# Patient Record
Sex: FEMALE | Race: WHITE
[De-identification: names, ages, dates, MRNs, and addresses within clinical notes are randomized per-mention and may not be internally consistent; named-entity substitution may affect disease eponyms.]

---

## 2021-04-16 ENCOUNTER — HOSPITAL ENCOUNTER (OUTPATIENT)
Age: 21
End: 2021-04-16
Payer: COMMERCIAL

## 2021-04-16 DIAGNOSIS — N39.0: Primary | ICD-10-CM

## 2021-04-16 PROCEDURE — 76770 US EXAM ABDO BACK WALL COMP: CPT

## 2021-05-25 ENCOUNTER — HOSPITAL ENCOUNTER (OUTPATIENT)
Dept: HOSPITAL 100 - SDC | Age: 21
Discharge: HOME | End: 2021-05-25
Payer: COMMERCIAL

## 2021-05-25 VITALS
DIASTOLIC BLOOD PRESSURE: 70 MMHG | OXYGEN SATURATION: 96 % | RESPIRATION RATE: 16 BRPM | SYSTOLIC BLOOD PRESSURE: 121 MMHG | HEART RATE: 75 BPM

## 2021-05-25 VITALS
SYSTOLIC BLOOD PRESSURE: 121 MMHG | RESPIRATION RATE: 16 BRPM | DIASTOLIC BLOOD PRESSURE: 68 MMHG | HEART RATE: 64 BPM | OXYGEN SATURATION: 99 %

## 2021-05-25 VITALS
TEMPERATURE: 97.88 F | RESPIRATION RATE: 18 BRPM | SYSTOLIC BLOOD PRESSURE: 121 MMHG | OXYGEN SATURATION: 100 % | DIASTOLIC BLOOD PRESSURE: 68 MMHG | HEART RATE: 73 BPM

## 2021-05-25 VITALS
SYSTOLIC BLOOD PRESSURE: 121 MMHG | DIASTOLIC BLOOD PRESSURE: 73 MMHG | OXYGEN SATURATION: 98 % | HEART RATE: 69 BPM | RESPIRATION RATE: 16 BRPM | TEMPERATURE: 97.3 F

## 2021-05-25 VITALS
RESPIRATION RATE: 16 BRPM | SYSTOLIC BLOOD PRESSURE: 109 MMHG | HEART RATE: 73 BPM | DIASTOLIC BLOOD PRESSURE: 76 MMHG | OXYGEN SATURATION: 100 %

## 2021-05-25 VITALS
RESPIRATION RATE: 16 BRPM | TEMPERATURE: 97.52 F | DIASTOLIC BLOOD PRESSURE: 81 MMHG | HEART RATE: 68 BPM | SYSTOLIC BLOOD PRESSURE: 108 MMHG | OXYGEN SATURATION: 98 %

## 2021-05-25 VITALS
TEMPERATURE: 97.3 F | OXYGEN SATURATION: 98 % | RESPIRATION RATE: 16 BRPM | HEART RATE: 75 BPM | SYSTOLIC BLOOD PRESSURE: 121 MMHG | DIASTOLIC BLOOD PRESSURE: 68 MMHG

## 2021-05-25 VITALS
DIASTOLIC BLOOD PRESSURE: 68 MMHG | SYSTOLIC BLOOD PRESSURE: 121 MMHG | RESPIRATION RATE: 16 BRPM | HEART RATE: 66 BPM | OXYGEN SATURATION: 99 %

## 2021-05-25 VITALS — BODY MASS INDEX: 30.2 KG/M2

## 2021-05-25 DIAGNOSIS — Z87.2: ICD-10-CM

## 2021-05-25 DIAGNOSIS — N39.0: Primary | ICD-10-CM

## 2021-05-25 DIAGNOSIS — N39.41: ICD-10-CM

## 2021-05-25 DIAGNOSIS — Z79.3: ICD-10-CM

## 2021-05-25 LAB — INTERNAL QC VALIDATED?: (no result)

## 2021-05-25 PROCEDURE — 52000 CYSTOURETHROSCOPY: CPT

## 2021-05-25 PROCEDURE — 0TBB8ZX EXCISION OF BLADDER, VIA NATURAL OR ARTIFICIAL OPENING ENDOSCOPIC, DIAGNOSTIC: ICD-10-PCS | Performed by: UROLOGY

## 2021-05-25 PROCEDURE — 00910 ANES TRANSURETHRAL PX NOS: CPT

## 2021-05-25 PROCEDURE — 81025 URINE PREGNANCY TEST: CPT

## 2021-05-25 RX ADMIN — CEFAZOLIN 150 GM: 10 INJECTION, POWDER, FOR SOLUTION INTRAVENOUS at 07:30

## 2023-09-14 PROBLEM — G43.909 MIGRAINE HEADACHE: Status: ACTIVE | Noted: 2023-09-14

## 2023-09-14 PROBLEM — E78.1 HYPERTRIGLYCERIDEMIA: Status: ACTIVE | Noted: 2023-09-14

## 2023-09-14 PROBLEM — F11.20 OPIOID DEPENDENCE (MULTI): Status: ACTIVE | Noted: 2023-09-14

## 2023-09-14 PROBLEM — D70.9 NEUTROPENIA (CMS-HCC): Status: ACTIVE | Noted: 2023-09-14

## 2023-09-14 PROBLEM — R20.0 NUMBNESS: Status: ACTIVE | Noted: 2023-09-14

## 2023-09-14 PROBLEM — R11.0 CHEMOTHERAPY-INDUCED NAUSEA: Status: ACTIVE | Noted: 2023-09-14

## 2023-09-14 PROBLEM — T45.1X5A CHEMOTHERAPY-INDUCED NEUROPATHY (MULTI): Status: ACTIVE | Noted: 2023-09-14

## 2023-09-14 PROBLEM — F41.9 ANXIETY: Status: ACTIVE | Noted: 2023-09-14

## 2023-09-14 PROBLEM — G62.0 CHEMOTHERAPY-INDUCED NEUROPATHY (MULTI): Status: ACTIVE | Noted: 2023-09-14

## 2023-09-14 PROBLEM — N64.52 NIPPLE DISCHARGE: Status: ACTIVE | Noted: 2023-09-14

## 2023-09-14 PROBLEM — N94.6 DYSMENORRHEA: Status: ACTIVE | Noted: 2023-09-14

## 2023-09-14 PROBLEM — G47.10 HYPERSOMNIA, UNSPECIFIED: Status: ACTIVE | Noted: 2023-09-14

## 2023-09-14 PROBLEM — G47.30 SLEEP-DISORDERED BREATHING: Status: ACTIVE | Noted: 2023-09-14

## 2023-09-14 PROBLEM — T45.1X5A CHEMOTHERAPY-INDUCED NAUSEA: Status: ACTIVE | Noted: 2023-09-14

## 2023-09-14 PROBLEM — H35.63: Status: ACTIVE | Noted: 2023-09-14

## 2023-09-14 PROBLEM — R94.31 PROLONGED QT INTERVAL: Status: ACTIVE | Noted: 2023-09-14

## 2023-09-14 PROBLEM — F51.09 SITUATIONAL INSOMNIA: Status: ACTIVE | Noted: 2023-09-14

## 2023-09-14 PROBLEM — C92.40: Status: ACTIVE | Noted: 2023-09-14

## 2023-09-14 PROBLEM — G43.009 MIGRAINE WITHOUT AURA AND WITHOUT STATUS MIGRAINOSUS, NOT INTRACTABLE: Status: ACTIVE | Noted: 2023-09-14

## 2023-09-14 RX ORDER — NAPROXEN 500 MG/1
500 TABLET ORAL 3 TIMES DAILY PRN
COMMUNITY
End: 2024-03-27 | Stop reason: WASHOUT

## 2023-10-17 ENCOUNTER — OFFICE VISIT (OUTPATIENT)
Dept: OPHTHALMOLOGY | Facility: CLINIC | Age: 23
End: 2023-10-17
Payer: COMMERCIAL

## 2023-10-17 DIAGNOSIS — H35.63 RETINAL HEMORRHAGE, BOTH EYES: ICD-10-CM

## 2023-10-17 DIAGNOSIS — H52.03 HYPEROPIA OF BOTH EYES: Primary | ICD-10-CM

## 2023-10-17 PROCEDURE — 92012 INTRM OPH EXAM EST PATIENT: CPT | Performed by: STUDENT IN AN ORGANIZED HEALTH CARE EDUCATION/TRAINING PROGRAM

## 2023-10-17 ASSESSMENT — ENCOUNTER SYMPTOMS
CARDIOVASCULAR NEGATIVE: 0
ALLERGIC/IMMUNOLOGIC NEGATIVE: 0
PSYCHIATRIC NEGATIVE: 0
RESPIRATORY NEGATIVE: 0
ENDOCRINE NEGATIVE: 0
HEMATOLOGIC/LYMPHATIC NEGATIVE: 0
MUSCULOSKELETAL NEGATIVE: 0
CONSTITUTIONAL NEGATIVE: 0
GASTROINTESTINAL NEGATIVE: 0
EYES NEGATIVE: 0
NEUROLOGICAL NEGATIVE: 0

## 2023-10-17 ASSESSMENT — SLIT LAMP EXAM - LIDS
COMMENTS: NORMAL
COMMENTS: NORMAL

## 2023-10-17 ASSESSMENT — VISUAL ACUITY
OD_SC: 20/20
METHOD: SNELLEN - LINEAR
OS_SC: 20/30

## 2023-10-17 ASSESSMENT — REFRACTION_MANIFEST
OS_SPHERE: -0.75
OS_CYLINDER: SPHERE
METHOD_AUTOREFRACTION: 1
OD_SPHERE: +0.25
OS_SPHERE: +0.25
OD_CYLINDER: SPHERE
OD_SPHERE: -0.50

## 2023-10-17 ASSESSMENT — TONOMETRY
IOP_METHOD: GOLDMANN APPLANATION
OS_IOP_MMHG: 15
OD_IOP_MMHG: 14

## 2023-10-17 ASSESSMENT — CUP TO DISC RATIO
OS_RATIO: .40
OD_RATIO: .40

## 2023-10-17 ASSESSMENT — CONF VISUAL FIELD
OS_SUPERIOR_TEMPORAL_RESTRICTION: 0
METHOD: COUNTING FINGERS
OS_NORMAL: 1
OD_INFERIOR_NASAL_RESTRICTION: 0
OD_SUPERIOR_NASAL_RESTRICTION: 0
OD_NORMAL: 1
OS_INFERIOR_TEMPORAL_RESTRICTION: 0
OS_SUPERIOR_NASAL_RESTRICTION: 0
OS_INFERIOR_NASAL_RESTRICTION: 0
OD_INFERIOR_TEMPORAL_RESTRICTION: 0
OD_SUPERIOR_TEMPORAL_RESTRICTION: 0

## 2023-10-17 ASSESSMENT — EXTERNAL EXAM - RIGHT EYE: OD_EXAM: NORMAL

## 2023-10-17 ASSESSMENT — EXTERNAL EXAM - LEFT EYE: OS_EXAM: NORMAL

## 2023-10-17 NOTE — ASSESSMENT & PLAN NOTE
Patient here for vision exam. Noticing central vision changes left eye (OS). 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 Patient reports noticing floaters in her vision that is causing disruption.     On exam today small MRX that does not improve vision. BCVA right eye (OD) 20/20 left eye (OS) 20/30. Advised that MRX is not necessary to fill; would not help with visual acuity.    OCT retina taken today to rule out central edema. Normal OCT both eyes (OU).     Patient has appt with retina for further eval 12/2023. RTC as scheduled for further eval.

## 2023-10-17 NOTE — ASSESSMENT & PLAN NOTE
Patient's last exam with retina Dr. 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     Retina appears healthy today with resolution of hemes and normal MAC OCT.

## 2023-10-17 NOTE — PROGRESS NOTES
Assessment/Plan   Problem List Items Addressed This Visit          Eye/Vision problems    Retinal hemorrhage, both eyes - Primary     Patient's last exam with retina Dr. 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     Retina appears healthy today with resolution of hemes and normal MAC OCT.          Relevant Orders    OCT, Retina - OU - Both Eyes (Completed)    Hyperopia of both eyes     Patient here for vision exam. Noticing central vision changes left eye (OS). 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 Patient reports noticing floaters in her vision that is causing disruption.     On exam today small MRX that does not improve vision. BCVA right eye (OD) 20/20 left eye (OS) 20/30. Advised that MRX is not necessary to fill; would not help with visual acuity.    OCT retina taken today to rule out central edema. Normal OCT both eyes (OU).     Patient has appt with retina for further eval 12/2023. RTC as scheduled for further eval.

## 2023-11-29 ENCOUNTER — APPOINTMENT (OUTPATIENT)
Dept: PEDIATRIC HEMATOLOGY/ONCOLOGY | Facility: HOSPITAL | Age: 23
End: 2023-11-29
Payer: COMMERCIAL

## 2023-12-06 ENCOUNTER — APPOINTMENT (OUTPATIENT)
Dept: PEDIATRIC HEMATOLOGY/ONCOLOGY | Facility: HOSPITAL | Age: 23
End: 2023-12-06
Payer: COMMERCIAL

## 2023-12-08 ENCOUNTER — OFFICE VISIT (OUTPATIENT)
Dept: OPHTHALMOLOGY | Facility: CLINIC | Age: 23
End: 2023-12-08
Payer: COMMERCIAL

## 2023-12-08 DIAGNOSIS — H35.63 RETINAL HEMORRHAGE OF BOTH EYES: Primary | ICD-10-CM

## 2023-12-08 PROCEDURE — 92134 CPTRZ OPH DX IMG PST SGM RTA: CPT | Mod: BILATERAL PROCEDURE | Performed by: OPHTHALMOLOGY

## 2023-12-08 PROCEDURE — 99214 OFFICE O/P EST MOD 30 MIN: CPT | Performed by: OPHTHALMOLOGY

## 2023-12-08 RX ORDER — BUPROPION HYDROCHLORIDE 300 MG/1
300 TABLET ORAL
COMMUNITY
Start: 2023-11-28

## 2023-12-08 ASSESSMENT — TONOMETRY
OS_IOP_MMHG: 14
OD_IOP_MMHG: 12
IOP_METHOD: GOLDMANN APPLANATION

## 2023-12-08 ASSESSMENT — VISUAL ACUITY
OD_SC: 20/20
OS_SC: 20/25-2
METHOD: SNELLEN - LINEAR

## 2023-12-08 ASSESSMENT — EXTERNAL EXAM - LEFT EYE: OS_EXAM: NORMAL

## 2023-12-08 ASSESSMENT — SLIT LAMP EXAM - LIDS
COMMENTS: NORMAL
COMMENTS: NORMAL

## 2023-12-08 ASSESSMENT — EXTERNAL EXAM - RIGHT EYE: OD_EXAM: NORMAL

## 2023-12-08 NOTE — PROGRESS NOTES
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       Hi quality OCT  scans obtained  signal good    OCT OD - Normal Foveal Contour, No Edema, IS/OS Junction Normal  OCT OS - Normal Foveal Contour, No Edema, IS/OS Junction Normal    additional commnents:    Leukemic retinopathy OU   Hemes have almost completely resolved  Educated patient of this and natural resolution. Continue treatment with heme/onc.  Will monitor. RTC 6 months, sooner PRN    small scotomas are resolving both eyes but worse left eye      Needs fluorescein  angiogram (second)  Visual filed 30-2  (first)

## 2023-12-13 ENCOUNTER — APPOINTMENT (OUTPATIENT)
Dept: PEDIATRIC HEMATOLOGY/ONCOLOGY | Facility: HOSPITAL | Age: 23
End: 2023-12-13
Payer: COMMERCIAL

## 2023-12-15 NOTE — PROGRESS NOTES
Patient ID: Cayetano Crooks is a 23 y.o. female.  Referring Physician: No referring provider defined for this encounter.  Primary Care Provider: No primary care provider on file.    Date of Service:  12/20/2023    SUBJECTIVE:    History of Present Illness:  Cayetano is overall doing well. She states that she is trying to keep a more healthy diet and has been walking more. Denies any concerns such as lymphadenopathy, fatigue, SOB, chest pain, bleeding and bruising issues. Is sleeping well and has been going out with her friends.     She is not taking any medications at his point and continues to live with her boyfriend. No concerns reported.      Oncology History:    Oncology History    No history exists.       Past Medical / Family / Social History:  Past Medical, Family, and Social History reviewed and unchanged since the last visit.    Review of Systems   Constitutional:  Negative for appetite change, chills, fatigue, fever and unexpected weight change.   HENT:   Negative for lump/mass, mouth sores and nosebleeds.    Eyes:  Negative for icterus.   Respiratory:  Negative for cough and shortness of breath.    Cardiovascular:  Negative for chest pain.   Gastrointestinal:  Negative for abdominal pain, blood in stool, constipation, diarrhea and nausea.   Genitourinary:  Negative for frequency.    Skin:  Negative for rash.   Neurological:  Negative for headaches.   Hematological:  Negative for adenopathy. Does not bruise/bleed easily.         OBJECTIVE:    BP as of 12/20/2023 132/87        Heart Rate: 84  as of 12/20/2023   Resp: 20  as of 12/20/2023   Temp: 36.8 °C (98.2 °F)  as of 12/20/2023       Physical Exam  Constitutional:       Appearance: Normal appearance.   HENT:      Head: Normocephalic and atraumatic.      Nose: Nose normal.      Mouth/Throat:      Mouth: Mucous membranes are moist.      Pharynx: Oropharynx is clear.   Cardiovascular:      Rate and Rhythm: Normal rate and regular rhythm.   Pulmonary:       Effort: Pulmonary effort is normal.      Breath sounds: Normal breath sounds. No wheezing or rhonchi.   Abdominal:      General: Bowel sounds are normal.      Palpations: Abdomen is soft.      Tenderness: There is no abdominal tenderness. There is no guarding.   Musculoskeletal:         General: Normal range of motion.      Cervical back: Normal range of motion and neck supple.   Skin:     General: Skin is warm.      Findings: No rash.   Neurological:      General: No focal deficit present.      Mental Status: She is alert and oriented to person, place, and time.   Psychiatric:         Mood and Affect: Mood normal.         Laboratory:   Latest Reference Range & Units 12/20/23 09:14   GLUCOSE 74 - 99 mg/dL 91   SODIUM 136 - 145 mmol/L 141   POTASSIUM 3.5 - 5.3 mmol/L 4.3   CHLORIDE 98 - 107 mmol/L 105   Bicarbonate 21 - 32 mmol/L 26   Anion Gap 10 - 20 mmol/L 14   Blood Urea Nitrogen 6 - 23 mg/dL 6   Creatinine 0.50 - 1.05 mg/dL 0.62   EGFR >60 mL/min/1.73m*2 >90   Calcium 8.6 - 10.6 mg/dL 9.7   PHOSPHORUS 2.5 - 4.9 mg/dL 3.5   Albumin 3.4 - 5.0 g/dL 4.4   Alkaline Phosphatase 33 - 110 U/L 66   ALT 7 - 45 U/L 10   AST 9 - 39 U/L 8 (L)   Bilirubin Total 0.0 - 1.2 mg/dL 0.6   Bilirubin, Direct 0.0 - 0.3 mg/dL 0.1   Total Protein 6.4 - 8.2 g/dL 7.3   PML/RANJIT T(15;17), FISH  Rpt   WBC 4.4 - 11.3 x10*3/uL 6.4   nRBC 0.0 - 0.0 /100 WBCs 0.0   RBC 4.00 - 5.20 x10*6/uL 4.76   HEMOGLOBIN 12.0 - 16.0 g/dL 13.9   HEMATOCRIT 36.0 - 46.0 % 41.6   MCV 80 - 100 fL 87   MCH 26.0 - 34.0 pg 29.2   MCHC 32.0 - 36.0 g/dL 33.4   RED CELL DISTRIBUTION WIDTH 11.5 - 14.5 % 11.8   Platelets 150 - 450 x10*3/uL 266   Neutrophils % 40.0 - 80.0 % 76.3   Immature Granulocytes %, Automated 0.0 - 0.9 % 0.3   Lymphocytes % 13.0 - 44.0 % 12.6   Monocytes % 2.0 - 10.0 % 9.6   Eosinophils % 0.0 - 6.0 % 0.9   Basophils % 0.0 - 2.0 % 0.3   Neutrophils Absolute 1.20 - 7.70 x10*3/uL 4.90   Immature Granulocytes Absolute, Automated 0.00 - 0.70 x10*3/uL  0.02   Lymphocytes Absolute 1.20 - 4.80 x10*3/uL 0.81 (L)   Monocytes Absolute 0.10 - 1.00 x10*3/uL 0.62   Eosinophils Absolute 0.00 - 0.70 x10*3/uL 0.06   Basophils Absolute 0.00 - 0.10 x10*3/uL 0.02   (L): Data is abnormally low  Rpt: View report in Results Review for more information    ASSESSMENT and PLAN:    Cayetano is a 23 yr old female with history of APML, treated as per [id number].  Completed therapy on 6/20/22. Here today for 18 month off therapy visit.     Cayetano is well appearing on exam today. Her counts are all wnl. No new concerns.      PML:RANJIT FISH by peripheral blood was performed this time and it was negative. We will send RT-PCR for her next visit.    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 Completed Induction therapy on 11/23/21.   - End of Induction BM bx on 11/23/21 in both morphologic and molecular remission (PML- RANJIT copies undetectable). PML-RANJIT FISH by peripheral blood negative after two consolidation cycles.   - Last day of chemotherapy was 6/20/22. Most recent PB PML-RANJIT RQ-PCR sent 8/30/23 was negative. FISH sent today 12/20/23 was negative.    - Will continue with every 3 month visits      CNS:  - Headaches: Completed Propranolol wean for migraines. No recent migraines. Dr. Corley followed Cayetano however, no further follow-up is needed at this time unless headaches return.      Optho:  - hx of subconjunctival hemorrhage. Denies new or worsening vision changes but continued poor vision in left eye especially with reading.  Interested in glasses, has appointment for evaluation for glasses upcoming.   - Cayetano saw Optho on 6/16/23, stable to improving exam. Scheduled to see them again in December.      CV:   - Continue with every 5 years per COG guidelines, next due 5/2028       GYN:   - Followed by TAMMY who reports she has good ovarian reserve function, follows  with GYN.      RTC: in 3 months (March 2024) for labs and physical exam, she will need a PML-RANJIT RQ-PCR sent at that visit.

## 2023-12-20 ENCOUNTER — HOSPITAL ENCOUNTER (OUTPATIENT)
Dept: PEDIATRIC HEMATOLOGY/ONCOLOGY | Facility: HOSPITAL | Age: 23
Discharge: HOME | End: 2023-12-20
Payer: COMMERCIAL

## 2023-12-20 VITALS
HEART RATE: 84 BPM | DIASTOLIC BLOOD PRESSURE: 87 MMHG | WEIGHT: 186.07 LBS | TEMPERATURE: 98.2 F | BODY MASS INDEX: 28.2 KG/M2 | HEIGHT: 68 IN | RESPIRATION RATE: 20 BRPM | SYSTOLIC BLOOD PRESSURE: 132 MMHG

## 2023-12-20 DIAGNOSIS — C92.41 ACUTE PROMYELOCYTIC LEUKEMIA IN REMISSION (MULTI): Primary | ICD-10-CM

## 2023-12-20 LAB
ALBUMIN SERPL BCP-MCNC: 4.4 G/DL (ref 3.4–5)
ALP SERPL-CCNC: 66 U/L (ref 33–110)
ALT SERPL W P-5'-P-CCNC: 10 U/L (ref 7–45)
ANION GAP SERPL CALC-SCNC: 14 MMOL/L (ref 10–20)
AST SERPL W P-5'-P-CCNC: 8 U/L (ref 9–39)
BASOPHILS # BLD AUTO: 0.02 X10*3/UL (ref 0–0.1)
BASOPHILS NFR BLD AUTO: 0.3 %
BILIRUB DIRECT SERPL-MCNC: 0.1 MG/DL (ref 0–0.3)
BILIRUB SERPL-MCNC: 0.6 MG/DL (ref 0–1.2)
BUN SERPL-MCNC: 6 MG/DL (ref 6–23)
CALCIUM SERPL-MCNC: 9.7 MG/DL (ref 8.6–10.6)
CHLORIDE SERPL-SCNC: 105 MMOL/L (ref 98–107)
CO2 SERPL-SCNC: 26 MMOL/L (ref 21–32)
CREAT SERPL-MCNC: 0.62 MG/DL (ref 0.5–1.05)
EOSINOPHIL # BLD AUTO: 0.06 X10*3/UL (ref 0–0.7)
EOSINOPHIL NFR BLD AUTO: 0.9 %
ERYTHROCYTE [DISTWIDTH] IN BLOOD BY AUTOMATED COUNT: 11.8 % (ref 11.5–14.5)
GFR SERPL CREATININE-BSD FRML MDRD: >90 ML/MIN/1.73M*2
GLUCOSE SERPL-MCNC: 91 MG/DL (ref 74–99)
HCT VFR BLD AUTO: 41.6 % (ref 36–46)
HGB BLD-MCNC: 13.9 G/DL (ref 12–16)
IMM GRANULOCYTES # BLD AUTO: 0.02 X10*3/UL (ref 0–0.7)
IMM GRANULOCYTES NFR BLD AUTO: 0.3 % (ref 0–0.9)
LYMPHOCYTES # BLD AUTO: 0.81 X10*3/UL (ref 1.2–4.8)
LYMPHOCYTES NFR BLD AUTO: 12.6 %
MCH RBC QN AUTO: 29.2 PG (ref 26–34)
MCHC RBC AUTO-ENTMCNC: 33.4 G/DL (ref 32–36)
MCV RBC AUTO: 87 FL (ref 80–100)
MONOCYTES # BLD AUTO: 0.62 X10*3/UL (ref 0.1–1)
MONOCYTES NFR BLD AUTO: 9.6 %
NEUTROPHILS # BLD AUTO: 4.9 X10*3/UL (ref 1.2–7.7)
NEUTROPHILS NFR BLD AUTO: 76.3 %
NRBC BLD-RTO: 0 /100 WBCS (ref 0–0)
PHOSPHATE SERPL-MCNC: 3.5 MG/DL (ref 2.5–4.9)
PLATELET # BLD AUTO: 266 X10*3/UL (ref 150–450)
POTASSIUM SERPL-SCNC: 4.3 MMOL/L (ref 3.5–5.3)
PROT SERPL-MCNC: 7.3 G/DL (ref 6.4–8.2)
RBC # BLD AUTO: 4.76 X10*6/UL (ref 4–5.2)
SODIUM SERPL-SCNC: 141 MMOL/L (ref 136–145)
WBC # BLD AUTO: 6.4 X10*3/UL (ref 4.4–11.3)

## 2023-12-20 PROCEDURE — 88275 CYTOGENETICS 100-300: CPT | Performed by: NURSE PRACTITIONER

## 2023-12-20 PROCEDURE — 99215 OFFICE O/P EST HI 40 MIN: CPT | Performed by: STUDENT IN AN ORGANIZED HEALTH CARE EDUCATION/TRAINING PROGRAM

## 2023-12-20 PROCEDURE — 88271 CYTOGENETICS DNA PROBE: CPT | Performed by: NURSE PRACTITIONER

## 2023-12-20 PROCEDURE — 85025 COMPLETE CBC W/AUTO DIFF WBC: CPT | Performed by: NURSE PRACTITIONER

## 2023-12-20 PROCEDURE — 36415 COLL VENOUS BLD VENIPUNCTURE: CPT | Performed by: NURSE PRACTITIONER

## 2023-12-20 PROCEDURE — 88291 CYTO/MOLECULAR REPORT: CPT | Performed by: NURSE PRACTITIONER

## 2023-12-20 PROCEDURE — 80048 BASIC METABOLIC PNL TOTAL CA: CPT | Performed by: NURSE PRACTITIONER

## 2023-12-20 PROCEDURE — 82248 BILIRUBIN DIRECT: CPT | Performed by: NURSE PRACTITIONER

## 2023-12-20 PROCEDURE — 84100 ASSAY OF PHOSPHORUS: CPT | Performed by: NURSE PRACTITIONER

## 2023-12-20 ASSESSMENT — ENCOUNTER SYMPTOMS
LOSS OF SENSATION IN FEET: 0
OCCASIONAL FEELINGS OF UNSTEADINESS: 0
DEPRESSION: 0

## 2023-12-20 ASSESSMENT — PAIN SCALES - GENERAL: PAINLEVEL: 0-NO PAIN

## 2023-12-20 NOTE — PROGRESS NOTES
Massage Therapy / Acupuncture Note:  I visited with Cayetano today.  She was in great spirits.  We talked about her family, how she has been feeling, how survivorship can be hard, friends and the holidays.  She stated she is doing well.  I will continue to follow.

## 2023-12-27 LAB
CHROM ANALY OVERALL INTERP-IMP: NORMAL
ELECTRONICALLY COSIGNED BY CYTOGENETICS: NORMAL
ELECTRONICALLY SIGNED BY CYTOGENETICS: NORMAL
STRUCT VAR ISCN NAME: NORMAL

## 2023-12-28 ENCOUNTER — TELEPHONE (OUTPATIENT)
Dept: PEDIATRIC HEMATOLOGY/ONCOLOGY | Facility: HOSPITAL | Age: 23
End: 2023-12-28

## 2023-12-28 NOTE — TELEPHONE ENCOUNTER
Reviewed recent RANJIT test with Cayetano which was negative. Will continue with same plan and she will return to clinic on 3/27.

## 2024-01-26 ENCOUNTER — OFFICE VISIT (OUTPATIENT)
Dept: OPHTHALMOLOGY | Facility: CLINIC | Age: 24
End: 2024-01-26
Payer: COMMERCIAL

## 2024-01-26 DIAGNOSIS — H52.03 HYPEROPIA OF BOTH EYES: ICD-10-CM

## 2024-01-26 DIAGNOSIS — H35.63 RETINAL HEMORRHAGE, BOTH EYES: ICD-10-CM

## 2024-01-26 DIAGNOSIS — F41.9 ANXIETY: ICD-10-CM

## 2024-01-26 DIAGNOSIS — H35.62 RETINAL HEMORRHAGE OF LEFT EYE: Primary | ICD-10-CM

## 2024-01-26 PROCEDURE — 92235 FLUORESCEIN ANGRPH MLTIFRAME: CPT | Mod: LEFT SIDE | Performed by: OPHTHALMOLOGY

## 2024-01-26 PROCEDURE — 1036F TOBACCO NON-USER: CPT | Performed by: OPHTHALMOLOGY

## 2024-01-26 PROCEDURE — 99214 OFFICE O/P EST MOD 30 MIN: CPT | Performed by: OPHTHALMOLOGY

## 2024-01-26 PROCEDURE — 92083 EXTENDED VISUAL FIELD XM: CPT | Performed by: OPHTHALMOLOGY

## 2024-01-26 PROCEDURE — 92134 CPTRZ OPH DX IMG PST SGM RTA: CPT | Mod: BILATERAL PROCEDURE | Performed by: OPHTHALMOLOGY

## 2024-01-26 RX ORDER — FLUORESCEIN 500 MG/ML
500 INJECTION INTRAVENOUS
Status: COMPLETED | OUTPATIENT
Start: 2024-01-26 | End: 2024-01-26

## 2024-01-26 RX ADMIN — FLUORESCEIN 500 MG: 500 INJECTION INTRAVENOUS at 09:33

## 2024-01-26 ASSESSMENT — TONOMETRY
OD_IOP_MMHG: 14
OS_IOP_MMHG: 15
IOP_METHOD: TONOPEN

## 2024-01-26 ASSESSMENT — VISUAL ACUITY
METHOD: SNELLEN - LINEAR
OD_SC: 20/20-2
OS_SC: 20/40-3

## 2024-01-26 ASSESSMENT — EXTERNAL EXAM - LEFT EYE: OS_EXAM: NORMAL

## 2024-01-26 ASSESSMENT — EXTERNAL EXAM - RIGHT EYE: OD_EXAM: NORMAL

## 2024-01-26 ASSESSMENT — SLIT LAMP EXAM - LIDS
COMMENTS: NORMAL
COMMENTS: NORMAL

## 2024-01-26 NOTE — PROGRESS NOTES
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       Hi quality OCT  scans obtained  signal good    OCT OD - Normal Foveal Contour, No Edema, IS/OS Junction Normal  OCT OS - Normal Foveal Contour, No Edema, IS/OS Junction Normal    additional commnents:    Leukemic retinopathy OU   Hemes have almost completely resolved  Educated patient of this and natural resolution. Continue treatment with heme/onc.  Will monitor. RTC 6 months, sooner PRN    small scotomas are resolving both eyes but worse left eye      Needs fluorescein  angiogram (second)  Visual filed 30-2  (first)    Visual [id number]  (VF) is full maybe need 10-2   Fluorescein angiography (FA) shows non specific hyper pigmentation    Needs glasses for left eye    FU 1 yr

## 2024-03-05 ASSESSMENT — ENCOUNTER SYMPTOMS
FREQUENCY: 0
ABDOMINAL PAIN: 0
CONSTIPATION: 0
APPETITE CHANGE: 0
FEVER: 0
CHILLS: 0
HEADACHES: 0
BRUISES/BLEEDS EASILY: 0
DIARRHEA: 0
BLOOD IN STOOL: 0
SCLERAL ICTERUS: 0
COUGH: 0
SHORTNESS OF BREATH: 0
UNEXPECTED WEIGHT CHANGE: 0
FATIGUE: 0
ADENOPATHY: 0
NAUSEA: 0

## 2024-03-05 NOTE — ADDENDUM NOTE
Encounter addended by: Eron Garcia MD on: 3/5/2024 12:09 PM   Actions taken: SmartForm saved, Pend clinical note, Level of Service modified, Clinical Note Signed

## 2024-03-21 ASSESSMENT — ENCOUNTER SYMPTOMS
SCLERAL ICTERUS: 0
BLOOD IN STOOL: 0
CONSTIPATION: 0
ADENOPATHY: 0
COUGH: 0
APPETITE CHANGE: 0
UNEXPECTED WEIGHT CHANGE: 0
BRUISES/BLEEDS EASILY: 0
FATIGUE: 0
CHILLS: 0
ABDOMINAL PAIN: 0
SHORTNESS OF BREATH: 0
HEADACHES: 0
FEVER: 0
DIARRHEA: 0
FREQUENCY: 0
NAUSEA: 0

## 2024-03-21 NOTE — PROGRESS NOTES
Patient ID: Cayetano Crooks is a 23 y.o. female.  Referring Physician: No referring provider defined for this encounter.  Primary Care Provider: No primary care provider on file.    Date of Service:  3/27/2024    SUBJECTIVE:    History of Present Illness:  Cayetano is in an excellent state of health,  no recent illness. She does not have any lymphadenopathy, fatigue, SOB, chest pain, bleeding and bruising issues. 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    She is looking forward to summer and getting her camper and motorcycle ready.  Is not taking any medications or supplements.      Oncology History:    Oncology History    No history exists.       Past Medical / Family / Social History:  Past Medical, Family, and Social History reviewed and unchanged since the last visit.    Review of Systems   Constitutional:  Negative for appetite change, chills, fatigue, fever and unexpected weight change.   HENT:   Negative for lump/mass, mouth sores and nosebleeds.    Eyes:  Negative for icterus.   Respiratory:  Negative for cough and shortness of breath.    Cardiovascular:  Negative for chest pain.   Gastrointestinal:  Negative for abdominal pain, blood in stool, constipation, diarrhea and nausea.   Genitourinary:  Negative for frequency.    Skin:  Negative for rash.   Neurological:  Negative for headaches.   Hematological:  Negative for adenopathy. Does not bruise/bleed easily.         OBJECTIVE:    BP as of 12/20/2023 132/87        Heart Rate: 84  as of 12/20/2023   Resp: 20  as of 12/20/2023   Temp: 36.8 °C (98.2 °F)  as of 12/20/2023       Physical Exam  Constitutional:       Appearance: Normal appearance.   HENT:      Head: Normocephalic and atraumatic.      Nose: Nose normal.      Mouth/Throat:      Mouth: Mucous membranes are moist.      Pharynx: Oropharynx is clear.   Eyes:      Extraocular Movements: Extraocular movements intact.      Pupils: Pupils are equal, round, and  reactive to light.   Cardiovascular:      Rate and Rhythm: Normal rate and regular rhythm.   Pulmonary:      Effort: Pulmonary effort is normal.      Breath sounds: Normal breath sounds. No wheezing or rhonchi.   Abdominal:      General: Bowel sounds are normal.      Palpations: Abdomen is soft.      Tenderness: There is no abdominal tenderness. There is no guarding.   Musculoskeletal:         General: Normal range of motion.      Cervical back: Normal range of motion and neck supple.   Skin:     General: Skin is warm.      Findings: No rash.   Neurological:      General: No focal deficit present.      Mental Status: She is alert and oriented to person, place, and time.   Psychiatric:         Mood and Affect: Mood normal.       Laboratory:   Latest Reference Range & Units 12/20/23 09:14   GLUCOSE 74 - 99 mg/dL 91   SODIUM 136 - 145 mmol/L 141   POTASSIUM 3.5 - 5.3 mmol/L 4.3   CHLORIDE 98 - 107 mmol/L 105   Bicarbonate 21 - 32 mmol/L 26   Anion Gap 10 - 20 mmol/L 14   Blood Urea Nitrogen 6 - 23 mg/dL 6   Creatinine 0.50 - 1.05 mg/dL 0.62   EGFR >60 mL/min/1.73m*2 >90   Calcium 8.6 - 10.6 mg/dL 9.7   PHOSPHORUS 2.5 - 4.9 mg/dL 3.5   Albumin 3.4 - 5.0 g/dL 4.4   Alkaline Phosphatase 33 - 110 U/L 66   ALT 7 - 45 U/L 10   AST 9 - 39 U/L 8 (L)   Bilirubin Total 0.0 - 1.2 mg/dL 0.6   Bilirubin, Direct 0.0 - 0.3 mg/dL 0.1   Total Protein 6.4 - 8.2 g/dL 7.3   PML/RANJIT T(15;17), FISH  Rpt   WBC 4.4 - 11.3 x10*3/uL 6.4   nRBC 0.0 - 0.0 /100 WBCs 0.0   RBC 4.00 - 5.20 x10*6/uL 4.76   HEMOGLOBIN 12.0 - 16.0 g/dL 13.9   HEMATOCRIT 36.0 - 46.0 % 41.6   MCV 80 - 100 fL 87   MCH 26.0 - 34.0 pg 29.2   MCHC 32.0 - 36.0 g/dL 33.4   RED CELL DISTRIBUTION WIDTH 11.5 - 14.5 % 11.8   Platelets 150 - 450 x10*3/uL 266   Neutrophils % 40.0 - 80.0 % 76.3   Immature Granulocytes %, Automated 0.0 - 0.9 % 0.3   Lymphocytes % 13.0 - 44.0 % 12.6   Monocytes % 2.0 - 10.0 % 9.6   Eosinophils % 0.0 - 6.0 % 0.9   Basophils % 0.0 - 2.0 % 0.3    Neutrophils Absolute 1.20 - 7.70 x10*3/uL 4.90   Immature Granulocytes Absolute, Automated 0.00 - 0.70 x10*3/uL 0.02   Lymphocytes Absolute 1.20 - 4.80 x10*3/uL 0.81 (L)   Monocytes Absolute 0.10 - 1.00 x10*3/uL 0.62   Eosinophils Absolute 0.00 - 0.70 x10*3/uL 0.06   Basophils Absolute 0.00 - 0.10 x10*3/uL 0.02   (L): Data is abnormally low  Rpt: View report in Results Review for more information    ASSESSMENT and PLAN:    Cayetano is a 23 yr old female with history of APML, treated as per [id number].  Completed therapy on 6/20/22. Here today for 22 month off therapy visit.     Cayetano is well appearing on exam today. Her counts are all wnl. No new concerns.      PML:RANJIT FISH by peripheral blood was performed this time and it was negative. We will send RT-PCR for her next visit.    ONC: Treated as per [id number], induction complicated by  differentiation syndrome, worsening DIC, and acute pulmonary alveolar hemorrhage requiring PICU level care. Completed Induction therapy on 11/23/21.   - End of Induction BM bx on 11/23/21 in both morphologic and molecular remission (PML- RANJIT copies undetectable). PML-RANJIT FISH by peripheral blood negative after two consolidation cycles.   - Last day of chemotherapy was 6/20/22. Most recent PB PML-RANJIT RQ-PCR sent 8/30/23 was negative. FISH sent today 3/27/23 was negative.    - Will continue with every 3 month visits      CNS:  - Headaches: . No recent migraines. Will refer to nuerology if they reoccur     Optho:  - hx of subconjunctival hemorrhage. Denies new or worsening vision changes but continued poor vision in left eye especially with reading.  Interested in glasses, has appointment for evaluation for glasses upcoming.   - Cayetano saw Optho on 1/24, stable to improving exam. Scheduled to see them again in June.      CV:   - Continue with every 5 years per COG guidelines, next due 5/2028       GYN:   - Followed by TAMMY who reports she has good ovarian reserve function, follows  with GYN.      RTC: in 3 months (June2024) for labs and physical exam, she will need a PML-RANJIT RQ-PCR sent at that visit, ordered today.

## 2024-03-27 ENCOUNTER — HOSPITAL ENCOUNTER (OUTPATIENT)
Dept: PEDIATRIC HEMATOLOGY/ONCOLOGY | Facility: HOSPITAL | Age: 24
Discharge: HOME | End: 2024-03-27
Payer: COMMERCIAL

## 2024-03-27 VITALS
TEMPERATURE: 97.8 F | BODY MASS INDEX: 29.03 KG/M2 | HEART RATE: 86 BPM | RESPIRATION RATE: 20 BRPM | WEIGHT: 184.97 LBS | HEIGHT: 67 IN | SYSTOLIC BLOOD PRESSURE: 120 MMHG | DIASTOLIC BLOOD PRESSURE: 79 MMHG

## 2024-03-27 DIAGNOSIS — C92.41 ACUTE PROMYELOCYTIC LEUKEMIA IN REMISSION (MULTI): Primary | ICD-10-CM

## 2024-03-27 LAB
ALBUMIN SERPL BCP-MCNC: 4.4 G/DL (ref 3.4–5)
ALP SERPL-CCNC: 68 U/L (ref 33–110)
ALT SERPL W P-5'-P-CCNC: 10 U/L (ref 7–45)
ANION GAP SERPL CALC-SCNC: 12 MMOL/L (ref 10–20)
AST SERPL W P-5'-P-CCNC: 11 U/L (ref 9–39)
BASOPHILS # BLD AUTO: 0.02 X10*3/UL (ref 0–0.1)
BASOPHILS NFR BLD AUTO: 0.4 %
BILIRUB DIRECT SERPL-MCNC: 0.1 MG/DL (ref 0–0.3)
BILIRUB SERPL-MCNC: 0.7 MG/DL (ref 0–1.2)
BUN SERPL-MCNC: 10 MG/DL (ref 6–23)
CALCIUM SERPL-MCNC: 9.3 MG/DL (ref 8.6–10.6)
CHLORIDE SERPL-SCNC: 105 MMOL/L (ref 98–107)
CO2 SERPL-SCNC: 29 MMOL/L (ref 21–32)
CREAT SERPL-MCNC: 0.7 MG/DL (ref 0.5–1.05)
EGFRCR SERPLBLD CKD-EPI 2021: >90 ML/MIN/1.73M*2
EOSINOPHIL # BLD AUTO: 0.07 X10*3/UL (ref 0–0.7)
EOSINOPHIL NFR BLD AUTO: 1.3 %
ERYTHROCYTE [DISTWIDTH] IN BLOOD BY AUTOMATED COUNT: 12 % (ref 11.5–14.5)
GLUCOSE SERPL-MCNC: 75 MG/DL (ref 74–99)
HCT VFR BLD AUTO: 38.8 % (ref 36–46)
HGB BLD-MCNC: 13.3 G/DL (ref 12–16)
IMM GRANULOCYTES # BLD AUTO: 0.02 X10*3/UL (ref 0–0.7)
IMM GRANULOCYTES NFR BLD AUTO: 0.4 % (ref 0–0.9)
LYMPHOCYTES # BLD AUTO: 1.01 X10*3/UL (ref 1.2–4.8)
LYMPHOCYTES NFR BLD AUTO: 19 %
MCH RBC QN AUTO: 29.7 PG (ref 26–34)
MCHC RBC AUTO-ENTMCNC: 34.3 G/DL (ref 32–36)
MCV RBC AUTO: 87 FL (ref 80–100)
MONOCYTES # BLD AUTO: 0.38 X10*3/UL (ref 0.1–1)
MONOCYTES NFR BLD AUTO: 7.2 %
NEUTROPHILS # BLD AUTO: 3.81 X10*3/UL (ref 1.2–7.7)
NEUTROPHILS NFR BLD AUTO: 71.7 %
NRBC BLD-RTO: 0 /100 WBCS (ref 0–0)
PHOSPHATE SERPL-MCNC: 3.4 MG/DL (ref 2.5–4.9)
PLATELET # BLD AUTO: 238 X10*3/UL (ref 150–450)
POTASSIUM SERPL-SCNC: 4 MMOL/L (ref 3.5–5.3)
PROT SERPL-MCNC: 7 G/DL (ref 6.4–8.2)
RBC # BLD AUTO: 4.48 X10*6/UL (ref 4–5.2)
SODIUM SERPL-SCNC: 142 MMOL/L (ref 136–145)
WBC # BLD AUTO: 5.3 X10*3/UL (ref 4.4–11.3)

## 2024-03-27 PROCEDURE — 36415 COLL VENOUS BLD VENIPUNCTURE: CPT | Performed by: NURSE PRACTITIONER

## 2024-03-27 PROCEDURE — 88271 CYTOGENETICS DNA PROBE: CPT | Performed by: NURSE PRACTITIONER

## 2024-03-27 PROCEDURE — 80053 COMPREHEN METABOLIC PANEL: CPT | Performed by: NURSE PRACTITIONER

## 2024-03-27 PROCEDURE — 99215 OFFICE O/P EST HI 40 MIN: CPT | Performed by: STUDENT IN AN ORGANIZED HEALTH CARE EDUCATION/TRAINING PROGRAM

## 2024-03-27 PROCEDURE — 84100 ASSAY OF PHOSPHORUS: CPT | Performed by: NURSE PRACTITIONER

## 2024-03-27 PROCEDURE — 85025 COMPLETE CBC W/AUTO DIFF WBC: CPT | Performed by: NURSE PRACTITIONER

## 2024-03-27 PROCEDURE — 82248 BILIRUBIN DIRECT: CPT | Performed by: NURSE PRACTITIONER

## 2024-03-27 ASSESSMENT — ENCOUNTER SYMPTOMS
LOSS OF SENSATION IN FEET: 0
DEPRESSION: 0
OCCASIONAL FEELINGS OF UNSTEADINESS: 0

## 2024-03-27 ASSESSMENT — PAIN SCALES - GENERAL: PAINLEVEL: 0-NO PAIN

## 2024-03-27 NOTE — PROGRESS NOTES
Massage Therapy / Acupuncture Note:  I visited with Cayetano today.  We talked about family and riding.  She stated she was doing well.  I will continue to follow.

## 2024-04-05 LAB
CHROM ANALY OVERALL INTERP-IMP: NORMAL
ELECTRONICALLY COSIGNED BY CYTOGENETICS: NORMAL
ELECTRONICALLY SIGNED BY CYTOGENETICS: NORMAL
FISH ISCN RESULTS: NORMAL

## 2024-04-09 NOTE — ADDENDUM NOTE
Encounter addended by: Eron Garcia MD on: 4/9/2024 1:50 PM   Actions taken: Level of Service modified

## 2024-04-10 ENCOUNTER — TELEPHONE (OUTPATIENT)
Dept: PEDIATRIC HEMATOLOGY/ONCOLOGY | Facility: HOSPITAL | Age: 24
End: 2024-04-10
Payer: MEDICARE

## 2024-04-10 NOTE — TELEPHONE ENCOUNTER
Reviewed RANJIT results with Cayetano which were negative. She was wondering about getting a tattoo, dicussed with her will review with her primary oncologist. Platelets and blood counts have been stable and she is almost 2 years off therapy.

## 2024-05-02 ENCOUNTER — OFFICE VISIT (OUTPATIENT)
Dept: OPHTHALMOLOGY | Facility: CLINIC | Age: 24
End: 2024-05-02
Payer: COMMERCIAL

## 2024-05-02 DIAGNOSIS — H35.63 RETINAL HEMORRHAGE, BOTH EYES: ICD-10-CM

## 2024-05-02 DIAGNOSIS — H52.03 HYPEROPIA OF BOTH EYES: Primary | ICD-10-CM

## 2024-05-02 PROCEDURE — 92015 DETERMINE REFRACTIVE STATE: CPT | Performed by: STUDENT IN AN ORGANIZED HEALTH CARE EDUCATION/TRAINING PROGRAM

## 2024-05-02 PROCEDURE — 92012 INTRM OPH EXAM EST PATIENT: CPT | Performed by: STUDENT IN AN ORGANIZED HEALTH CARE EDUCATION/TRAINING PROGRAM

## 2024-05-02 RX ORDER — BUSPIRONE HYDROCHLORIDE 5 MG/1
5 TABLET ORAL 2 TIMES DAILY
COMMUNITY
Start: 2024-04-07

## 2024-05-02 RX ORDER — AZELASTINE 1 MG/ML
SPRAY, METERED NASAL
COMMUNITY
Start: 2024-04-17

## 2024-05-02 RX ORDER — MONTELUKAST SODIUM 10 MG/1
10 TABLET ORAL DAILY
COMMUNITY
Start: 2024-04-17

## 2024-05-02 RX ORDER — ESCITALOPRAM OXALATE 20 MG/1
20 TABLET ORAL DAILY
COMMUNITY
Start: 2024-04-07

## 2024-05-02 RX ORDER — CETIRIZINE HYDROCHLORIDE 10 MG/1
10 TABLET ORAL DAILY
COMMUNITY
Start: 2024-04-07

## 2024-05-02 ASSESSMENT — ENCOUNTER SYMPTOMS
ALLERGIC/IMMUNOLOGIC NEGATIVE: 0
EYES NEGATIVE: 0
PSYCHIATRIC NEGATIVE: 0
MUSCULOSKELETAL NEGATIVE: 0
RESPIRATORY NEGATIVE: 0
HEMATOLOGIC/LYMPHATIC NEGATIVE: 0
NEUROLOGICAL NEGATIVE: 0
ENDOCRINE NEGATIVE: 0
CONSTITUTIONAL NEGATIVE: 0
CARDIOVASCULAR NEGATIVE: 0
GASTROINTESTINAL NEGATIVE: 0

## 2024-05-02 ASSESSMENT — REFRACTION
OD_SPHERE: +0.25
OS_SPHERE: +0.25
OD_CYLINDER: -0.25
OS_AXIS: 175
OD_AXIS: 170
OS_CYLINDER: -0.75

## 2024-05-02 ASSESSMENT — CONF VISUAL FIELD
OS_SUPERIOR_NASAL_RESTRICTION: 3
OD_SUPERIOR_TEMPORAL_RESTRICTION: 0
OD_INFERIOR_TEMPORAL_RESTRICTION: 0
OS_SUPERIOR_TEMPORAL_RESTRICTION: 0
OD_INFERIOR_NASAL_RESTRICTION: 0
OD_NORMAL: 1
OS_INFERIOR_TEMPORAL_RESTRICTION: 3
OD_SUPERIOR_NASAL_RESTRICTION: 0
OS_INFERIOR_NASAL_RESTRICTION: 0

## 2024-05-02 ASSESSMENT — REFRACTION_MANIFEST
OD_CYLINDER: SPHERE
OS_SPHERE: -1.50
OD_SPHERE: -0.50
OS_CYLINDER: SPHERE

## 2024-05-02 ASSESSMENT — TONOMETRY
OD_IOP_MMHG: 15
IOP_METHOD: GOLDMANN APPLANATION
OS_IOP_MMHG: 15

## 2024-05-02 ASSESSMENT — VISUAL ACUITY
OS_SC: 20/150
OD_SC: 20/20
METHOD: SNELLEN - LINEAR
OD_SC+: -2
OS_PH_SC: 20/125

## 2024-05-02 ASSESSMENT — EXTERNAL EXAM - LEFT EYE: OS_EXAM: NORMAL

## 2024-05-02 ASSESSMENT — SLIT LAMP EXAM - LIDS
COMMENTS: NORMAL
COMMENTS: NORMAL

## 2024-05-02 ASSESSMENT — EXTERNAL EXAM - RIGHT EYE: OD_EXAM: NORMAL

## 2024-05-02 NOTE — PROGRESS NOTES
Retinal hemorrhage, both eyes - Primary    Patient's last exam with retina Dr. 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     Retina appears healthy today with resolution of hemes and normal MAC OCT.         Relevant Orders   OCT, Retina - OU - Both Eyes (Completed)   Hyperopia of both eyes  Latent Hyperopia of both eyes    Patient here for vision exam. Noticing central vision changes left eye (OS). 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 Patient reports noticing floaters in her vision that is causing disruption.     On exam today a hyperopic shift is noted on wet MRX vs dry. Likely a small latent hyperopic component. BCVA on exam OD 20/20 OS 20/25. Patient accepts RX found today on trial frame and reports improvement in clarity left eye. Discussed with latent component that patient might be more comfortable starting with specs for NVO and then transitioning to full time use.    OCT retina taken today to rule out central edema. Normal OCT both eyes (OU).     Patient has appt with retina for further eval 1/31/25. RTC as scheduled for further eval.

## 2024-06-14 ENCOUNTER — APPOINTMENT (OUTPATIENT)
Dept: OPHTHALMOLOGY | Facility: CLINIC | Age: 24
End: 2024-06-14
Payer: MEDICARE

## 2024-06-19 ENCOUNTER — HOSPITAL ENCOUNTER (OUTPATIENT)
Dept: PEDIATRIC HEMATOLOGY/ONCOLOGY | Facility: HOSPITAL | Age: 24
Discharge: HOME | End: 2024-06-19
Payer: COMMERCIAL

## 2024-06-19 VITALS
WEIGHT: 183.64 LBS | BODY MASS INDEX: 28.82 KG/M2 | SYSTOLIC BLOOD PRESSURE: 127 MMHG | HEIGHT: 67 IN | TEMPERATURE: 98.2 F | RESPIRATION RATE: 20 BRPM | DIASTOLIC BLOOD PRESSURE: 76 MMHG | HEART RATE: 79 BPM

## 2024-06-19 DIAGNOSIS — C92.41 ACUTE PROMYELOCYTIC LEUKEMIA IN REMISSION (MULTI): ICD-10-CM

## 2024-06-19 LAB
ALBUMIN SERPL BCP-MCNC: 4.4 G/DL (ref 3.4–5)
ALP SERPL-CCNC: 62 U/L (ref 33–110)
ALT SERPL W P-5'-P-CCNC: 9 U/L (ref 7–45)
ANION GAP SERPL CALC-SCNC: 13 MMOL/L (ref 10–20)
AST SERPL W P-5'-P-CCNC: 10 U/L (ref 9–39)
BASOPHILS # BLD AUTO: 0.02 X10*3/UL (ref 0–0.1)
BASOPHILS NFR BLD AUTO: 0.4 %
BILIRUB DIRECT SERPL-MCNC: 0.1 MG/DL (ref 0–0.3)
BILIRUB SERPL-MCNC: 0.5 MG/DL (ref 0–1.2)
BUN SERPL-MCNC: 12 MG/DL (ref 6–23)
CALCIUM SERPL-MCNC: 9.5 MG/DL (ref 8.6–10.6)
CHLORIDE SERPL-SCNC: 105 MMOL/L (ref 98–107)
CO2 SERPL-SCNC: 27 MMOL/L (ref 21–32)
CREAT SERPL-MCNC: 0.63 MG/DL (ref 0.5–1.05)
EGFRCR SERPLBLD CKD-EPI 2021: >90 ML/MIN/1.73M*2
EOSINOPHIL # BLD AUTO: 0.18 X10*3/UL (ref 0–0.7)
EOSINOPHIL NFR BLD AUTO: 3.8 %
ERYTHROCYTE [DISTWIDTH] IN BLOOD BY AUTOMATED COUNT: 12 % (ref 11.5–14.5)
GLUCOSE SERPL-MCNC: 88 MG/DL (ref 74–99)
HCT VFR BLD AUTO: 39.9 % (ref 36–46)
HGB BLD-MCNC: 13.6 G/DL (ref 12–16)
IMM GRANULOCYTES # BLD AUTO: 0.01 X10*3/UL (ref 0–0.7)
IMM GRANULOCYTES NFR BLD AUTO: 0.2 % (ref 0–0.9)
LYMPHOCYTES # BLD AUTO: 1.09 X10*3/UL (ref 1.2–4.8)
LYMPHOCYTES NFR BLD AUTO: 23.3 %
MCH RBC QN AUTO: 29.8 PG (ref 26–34)
MCHC RBC AUTO-ENTMCNC: 34.1 G/DL (ref 32–36)
MCV RBC AUTO: 87 FL (ref 80–100)
MONOCYTES # BLD AUTO: 0.31 X10*3/UL (ref 0.1–1)
MONOCYTES NFR BLD AUTO: 6.6 %
NEUTROPHILS # BLD AUTO: 3.07 X10*3/UL (ref 1.2–7.7)
NEUTROPHILS NFR BLD AUTO: 65.7 %
NRBC BLD-RTO: 0 /100 WBCS (ref 0–0)
PHOSPHATE SERPL-MCNC: 3.5 MG/DL (ref 2.5–4.9)
PLATELET # BLD AUTO: 238 X10*3/UL (ref 150–450)
POTASSIUM SERPL-SCNC: 3.9 MMOL/L (ref 3.5–5.3)
PROT SERPL-MCNC: 7 G/DL (ref 6.4–8.2)
RBC # BLD AUTO: 4.57 X10*6/UL (ref 4–5.2)
SODIUM SERPL-SCNC: 141 MMOL/L (ref 136–145)
WBC # BLD AUTO: 4.7 X10*3/UL (ref 4.4–11.3)

## 2024-06-19 PROCEDURE — 99215 OFFICE O/P EST HI 40 MIN: CPT | Performed by: STUDENT IN AN ORGANIZED HEALTH CARE EDUCATION/TRAINING PROGRAM

## 2024-06-19 PROCEDURE — 36415 COLL VENOUS BLD VENIPUNCTURE: CPT | Performed by: NURSE PRACTITIONER

## 2024-06-19 PROCEDURE — 84075 ASSAY ALKALINE PHOSPHATASE: CPT | Performed by: NURSE PRACTITIONER

## 2024-06-19 PROCEDURE — 81207 BCR/ABL1 GENE MINOR BP: CPT | Performed by: NURSE PRACTITIONER

## 2024-06-19 PROCEDURE — G0452 MOLECULAR PATHOLOGY INTERPR: HCPCS | Performed by: NURSE PRACTITIONER

## 2024-06-19 PROCEDURE — 84100 ASSAY OF PHOSPHORUS: CPT | Performed by: NURSE PRACTITIONER

## 2024-06-19 PROCEDURE — 80048 BASIC METABOLIC PNL TOTAL CA: CPT | Performed by: NURSE PRACTITIONER

## 2024-06-19 PROCEDURE — 81206 BCR/ABL1 GENE MAJOR BP: CPT | Performed by: NURSE PRACTITIONER

## 2024-06-19 PROCEDURE — 85025 COMPLETE CBC W/AUTO DIFF WBC: CPT | Performed by: NURSE PRACTITIONER

## 2024-06-19 ASSESSMENT — ENCOUNTER SYMPTOMS
ABDOMINAL PAIN: 0
CONSTIPATION: 0
ADENOPATHY: 0
NAUSEA: 0
DIARRHEA: 0
COUGH: 0
FATIGUE: 1
DEPRESSION: 0
APPETITE CHANGE: 0
SCLERAL ICTERUS: 0
FREQUENCY: 0
SHORTNESS OF BREATH: 0
BRUISES/BLEEDS EASILY: 0
CHILLS: 0
HEADACHES: 0
LOSS OF SENSATION IN FEET: 0
FEVER: 0
OCCASIONAL FEELINGS OF UNSTEADINESS: 0

## 2024-06-19 ASSESSMENT — PAIN SCALES - GENERAL: PAINLEVEL: 0-NO PAIN

## 2024-06-19 NOTE — PROGRESS NOTES
Massage Therapy / Acupuncture Note:  I visited with Cayetano today.  She was in great spirits and stated she is feeling well.  She and her family were leaving for vacation today.  Two weeks in [place] by the [facility].  I will continue to check in.

## 2024-06-19 NOTE — PROGRESS NOTES
"Patient ID: Cayetano Crooks is a 23 y.o. female.  Referring Physician: Giancarlo Pulido, APRN-CNP  [address] [place] [address]  Primary Care Provider: No primary care provider on file.    Date of Service:  6/19/2024    SUBJECTIVE:    History of Present Illness:  Cayetano is in an excellent state of health, no recent illness. Cayetano is enjoying traveling this year. She c/o mild fatigue (seems r/t  of 7 y/o and 6 month old). No LAD, no SOB, no chest pain, no bleeding and no bruising issues. Normal menstrual cycles, takes Naproxen 500mg PRN for cramping which she also attributes to her light menstrual flows. C/O clear rhinorrhea, takes 10 mg Cetirizne daily for allergies. Scheduled to see allergist Aug 26 as referred by PCP. Wears glasses for driving and reading, F/U Ophtho Dec.      Oncology History:    Oncology History    No history exists.       Past Medical / Family / Social History:  Past Medical, Family, and Social History reviewed and unchanged since the last visit.    Review of Systems   Constitutional:  Positive for fatigue. Negative for appetite change, chills and fever.   HENT:   Negative for lump/mass, mouth sores and nosebleeds.    Eyes:  Negative for icterus.   Respiratory:  Negative for cough and shortness of breath.    Cardiovascular:  Negative for chest pain.   Gastrointestinal:  Negative for abdominal pain, constipation, diarrhea and nausea.   Genitourinary:  Negative for frequency.    Skin:  Negative for rash.   Neurological:  Negative for headaches.   Hematological:  Negative for adenopathy. Does not bruise/bleed easily.         OBJECTIVE:    ../76 (BP Location: Right arm, Patient Position: Sitting, BP Cuff Size: Adult)   Pulse 79   Temp 36.8 °C (98.2 °F) (Tympanic)   Resp 20   Ht 1.713 m (5' 7.44\")   Wt 83.3 kg (183 lb 10.3 oz)   BMI 28.39 kg/m²      Physical Exam  Constitutional:       Appearance: Normal appearance.   HENT:      Head: Normocephalic and " atraumatic.      Nose: Nose normal.      Mouth/Throat:      Mouth: Mucous membranes are moist.      Pharynx: Oropharynx is clear.   Eyes:      Extraocular Movements: Extraocular movements intact.      Pupils: Pupils are equal, round, and reactive to light.   Cardiovascular:      Rate and Rhythm: Normal rate and regular rhythm.   Pulmonary:      Effort: Pulmonary effort is normal.      Breath sounds: Normal breath sounds. No wheezing or rhonchi.   Abdominal:      General: Bowel sounds are normal.      Palpations: Abdomen is soft.      Tenderness: There is no abdominal tenderness. There is no guarding.   Musculoskeletal:         General: Normal range of motion.      Cervical back: Normal range of motion and neck supple.   Skin:     General: Skin is warm.      Findings: No rash.   Neurological:      General: No focal deficit present.      Mental Status: She is alert and oriented to person, place, and time.   Psychiatric:         Mood and Affect: Mood normal.         Laboratory:  ..  Hospital Outpatient Visit on 06/19/2024   Component Date Value Ref Range Status    WBC 06/19/2024 4.7  4.4 - 11.3 x10*3/uL Final    nRBC 06/19/2024 0.0  0.0 - 0.0 /100 WBCs Final    RBC 06/19/2024 4.57  4.00 - 5.20 x10*6/uL Final    Hemoglobin 06/19/2024 13.6  12.0 - 16.0 g/dL Final    Hematocrit 06/19/2024 39.9  36.0 - 46.0 % Final    MCV 06/19/2024 87  80 - 100 fL Final    MCH 06/19/2024 29.8  26.0 - 34.0 pg Final    MCHC 06/19/2024 34.1  32.0 - 36.0 g/dL Final    RDW 06/19/2024 12.0  11.5 - 14.5 % Final    Platelets 06/19/2024 238  150 - 450 x10*3/uL Final    Neutrophils % 06/19/2024 65.7  40.0 - 80.0 % Final    Immature Granulocytes %, Automated 06/19/2024 0.2  0.0 - 0.9 % Final    Immature Granulocyte Count (IG) includes promyelocytes, myelocytes and metamyelocytes but does not include bands. Percent differential counts (%) should be interpreted in the context of the absolute cell counts (cells/UL).    Lymphocytes % 06/19/2024 23.3   13.0 - 44.0 % Final    Monocytes % 06/19/2024 6.6  2.0 - 10.0 % Final    Eosinophils % 06/19/2024 3.8  0.0 - 6.0 % Final    Basophils % 06/19/2024 0.4  0.0 - 2.0 % Final    Neutrophils Absolute 06/19/2024 3.07  1.20 - 7.70 x10*3/uL Final    Percent differential counts (%) should be interpreted in the context of the absolute cell counts (cells/uL).    Immature Granulocytes Absolute, Au* 06/19/2024 0.01  0.00 - 0.70 x10*3/uL Final    Lymphocytes Absolute 06/19/2024 1.09 (L)  1.20 - 4.80 x10*3/uL Final    Monocytes Absolute 06/19/2024 0.31  0.10 - 1.00 x10*3/uL Final    Eosinophils Absolute 06/19/2024 0.18  0.00 - 0.70 x10*3/uL Final    Basophils Absolute 06/19/2024 0.02  0.00 - 0.10 x10*3/uL Final    Phosphorus 06/19/2024 3.5  2.5 - 4.9 mg/dL Final    The performance characteristics of phosphorus testing in heparinized plasma have been validated by the individual  laboratory site where testing is performed. Testing on heparinized plasma is not approved by the FDA; however, such approval is not necessary.    Albumin 06/19/2024 4.4  3.4 - 5.0 g/dL Final    Bilirubin, Total 06/19/2024 0.5  0.0 - 1.2 mg/dL Final    Bilirubin, Direct 06/19/2024 0.1  0.0 - 0.3 mg/dL Final    Alkaline Phosphatase 06/19/2024 62  33 - 110 U/L Final    ALT 06/19/2024 9  7 - 45 U/L Final    Patients treated with Sulfasalazine may generate falsely decreased results for ALT.    AST 06/19/2024 10  9 - 39 U/L Final    Total Protein 06/19/2024 7.0  6.4 - 8.2 g/dL Final    Specimen Type 06/19/2024    Final                    Value:Blood    BCR::ABL1 Major p210 Result 06/19/2024 Not Detected  Not Detected Final    BCR::ABL1 P210 Reporting Comment 06/19/2024    Final                    Value:This result contains rich text formatting which cannot be displayed here.    Electronically signed and reported* 06/19/2024    Final                    Value:Avis Kenny MD PhD    Specimen Type 06/19/2024    Final                    Value:Blood    BCR::ABL1  Minor p190 Result 06/19/2024 Not Detected  Not Detected Final    BCR::ABL1 P190 Reporting Comment 06/19/2024    Final                    Value:This result contains rich text formatting which cannot be displayed here.    Electronically signed and reported* 06/19/2024    Final                    Value:Silva Grajeda MD    Glucose 06/19/2024 88  74 - 99 mg/dL Final    Sodium 06/19/2024 141  136 - 145 mmol/L Final    Potassium 06/19/2024 3.9  3.5 - 5.3 mmol/L Final    Chloride 06/19/2024 105  98 - 107 mmol/L Final    Bicarbonate 06/19/2024 27  21 - 32 mmol/L Final    Anion Gap 06/19/2024 13  10 - 20 mmol/L Final    Urea Nitrogen 06/19/2024 12  6 - 23 mg/dL Final    Creatinine 06/19/2024 0.63  0.50 - 1.05 mg/dL Final    eGFR 06/19/2024 >90  >60 mL/min/1.73m*2 Final    Calculations of estimated GFR are performed using the 2021 CKD-EPI Study Refit equation without the race variable for the IDMS-Traceable creatinine methods.  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  Completed therapy on 6/20/22. Here today for 24 month off therapy visit.     Cayetano is well appearing on exam today. Her counts are all wnl. No new concerns.   PML::RANJIT RT-PCR Results: NEGATIVE  We will send RT-PCR for her next visit.    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 Completed Induction therapy on 11/23/21.   - End of Induction BM bx on 11/23/21 in both morphologic and molecular remission (PML- RANJIT copies undetectable). PML-RANJIT FISH by peripheral blood negative after two consolidation cycles.   - Last day of chemotherapy was 6/20/22.   - PB PML-RANJIT RQ-PCR sent 8/30/23 negative. FISH sent  3/27/23 was negative.    - Today: PML::RANJIT RT-PCR Results: NEGATIVE    - Will continue with every 3 month visits      CNS:  - Headaches: . No recent migraines. Will refer to neurology if they recur     Ophtho:  - H/O subconjunctival hemorrhage. Wears glasses for reading and driving.   - Scheduled to see Ophtho again in Dec.      CV:   - Continue with every 5 years per COG guidelines, next due 5/2028       GYN:   - Followed by TAMMY who reports she has good ovarian reserve function, follows with GYN.      RTC: in 3 months (9/11/24) for labs and physical exam. Notified Cayetano of today's PML::RANJIT RT-PCR negative results over the phone.    (Cayetano will need a PML-RANJIT RQ-PCR sent on 9/11/24 visit, ordered in [facility] today. )

## 2024-06-21 LAB
BCR/ABL1 MAJOR COMMENT: NORMAL
BCR/ABL1 MAJOR P210 RESULT: NOT DETECTED
ELECTRONICALLY SIGNED BY: NORMAL
UHTL SPECIMEN TYPE: NORMAL

## 2024-06-25 LAB — SCAN RESULT: NORMAL

## 2024-06-25 PROCEDURE — 9990000009 MISCELLANEOUS GENETICS TEST: Performed by: STUDENT IN AN ORGANIZED HEALTH CARE EDUCATION/TRAINING PROGRAM

## 2024-06-26 LAB
BCR/ABL1 MINOR COMMENT: NORMAL
BCR/ABL1 MINOR P190 RESULT: NOT DETECTED
ELECTRONICALLY SIGNED BY: NORMAL
UHTL SPECIMEN TYPE: NORMAL

## 2024-07-15 ENCOUNTER — TELEPHONE (OUTPATIENT)
Dept: PEDIATRIC HEMATOLOGY/ONCOLOGY | Facility: HOSPITAL | Age: 24
End: 2024-07-15
Payer: MEDICARE

## 2024-07-15 NOTE — TELEPHONE ENCOUNTER
Mom sent a fax requesting a dental clearance letter for oral surgery, Discussed with Dr. Barrientos. No clearance Is needed from us due to off therapy and no central line.   Called Mom with information. She verbalized understanding    UPDATE:  Spoke to Dental office as well as faxed a clearance letter. Team is aware.

## 2024-09-11 ENCOUNTER — HOSPITAL ENCOUNTER (OUTPATIENT)
Dept: PEDIATRIC HEMATOLOGY/ONCOLOGY | Facility: HOSPITAL | Age: 24
Discharge: HOME | End: 2024-09-11
Payer: MEDICARE

## 2024-09-11 VITALS
WEIGHT: 178.57 LBS | DIASTOLIC BLOOD PRESSURE: 78 MMHG | HEIGHT: 68 IN | HEART RATE: 78 BPM | BODY MASS INDEX: 27.06 KG/M2 | SYSTOLIC BLOOD PRESSURE: 114 MMHG

## 2024-09-11 VITALS
TEMPERATURE: 97.7 F | BODY MASS INDEX: 28.03 KG/M2 | WEIGHT: 178.57 LBS | HEART RATE: 75 BPM | HEIGHT: 67 IN | DIASTOLIC BLOOD PRESSURE: 78 MMHG | RESPIRATION RATE: 19 BRPM | SYSTOLIC BLOOD PRESSURE: 114 MMHG

## 2024-09-11 DIAGNOSIS — C92.41 ACUTE PROMYELOCYTIC LEUKEMIA IN REMISSION (MULTI): ICD-10-CM

## 2024-09-11 DIAGNOSIS — C92.40 ACUTE PROMYELOCYTIC LEUKEMIA NOT HAVING ACHIEVED REMISSION (MULTI): ICD-10-CM

## 2024-09-11 DIAGNOSIS — C92.40 ACUTE PROMYELOCYTIC LEUKEMIA NOT HAVING ACHIEVED REMISSION (MULTI): Primary | ICD-10-CM

## 2024-09-11 DIAGNOSIS — C92.41 ACUTE PROMYELOCYTIC LEUKEMIA IN REMISSION (MULTI): Primary | ICD-10-CM

## 2024-09-11 LAB
ALBUMIN SERPL BCP-MCNC: 4.9 G/DL (ref 3.4–5)
ALP SERPL-CCNC: 71 U/L (ref 33–110)
ALT SERPL W P-5'-P-CCNC: 8 U/L (ref 7–45)
ANION GAP SERPL CALC-SCNC: 13 MMOL/L (ref 10–20)
AST SERPL W P-5'-P-CCNC: 14 U/L (ref 9–39)
BASOPHILS # BLD AUTO: 0.02 X10*3/UL (ref 0–0.1)
BASOPHILS # BLD AUTO: 0.03 X10*3/UL (ref 0–0.1)
BASOPHILS NFR BLD AUTO: 0.4 %
BASOPHILS NFR BLD AUTO: 0.5 %
BILIRUB DIRECT SERPL-MCNC: 0.1 MG/DL (ref 0–0.3)
BILIRUB SERPL-MCNC: 0.7 MG/DL (ref 0–1.2)
BUN SERPL-MCNC: 8 MG/DL (ref 6–23)
CALCIUM SERPL-MCNC: 10 MG/DL (ref 8.6–10.6)
CHLORIDE SERPL-SCNC: 105 MMOL/L (ref 98–107)
CO2 SERPL-SCNC: 27 MMOL/L (ref 21–32)
CREAT SERPL-MCNC: 0.72 MG/DL (ref 0.5–1.05)
EGFRCR SERPLBLD CKD-EPI 2021: >90 ML/MIN/1.73M*2
EOSINOPHIL # BLD AUTO: 0.17 X10*3/UL (ref 0–0.7)
EOSINOPHIL # BLD AUTO: 0.2 X10*3/UL (ref 0–0.7)
EOSINOPHIL NFR BLD AUTO: 3.2 %
EOSINOPHIL NFR BLD AUTO: 3.3 %
ERYTHROCYTE [DISTWIDTH] IN BLOOD BY AUTOMATED COUNT: 11.9 % (ref 11.5–14.5)
ERYTHROCYTE [DISTWIDTH] IN BLOOD BY AUTOMATED COUNT: 11.9 % (ref 11.5–14.5)
EST. AVERAGE GLUCOSE BLD GHB EST-MCNC: 80 MG/DL
GLUCOSE P FAST SERPL-MCNC: 84 MG/DL (ref 74–99)
GLUCOSE SERPL-MCNC: 43 MG/DL (ref 74–99)
HBA1C MFR BLD: 4.4 %
HCT VFR BLD AUTO: 40.8 % (ref 36–46)
HCT VFR BLD AUTO: 41.2 % (ref 36–46)
HGB BLD-MCNC: 13.3 G/DL (ref 12–16)
HGB BLD-MCNC: 13.8 G/DL (ref 12–16)
HOLD SPECIMEN: NORMAL
IMM GRANULOCYTES # BLD AUTO: 0.02 X10*3/UL (ref 0–0.7)
IMM GRANULOCYTES # BLD AUTO: 0.02 X10*3/UL (ref 0–0.7)
IMM GRANULOCYTES NFR BLD AUTO: 0.3 % (ref 0–0.9)
IMM GRANULOCYTES NFR BLD AUTO: 0.4 % (ref 0–0.9)
LYMPHOCYTES # BLD AUTO: 1.13 X10*3/UL (ref 1.2–4.8)
LYMPHOCYTES # BLD AUTO: 1.66 X10*3/UL (ref 1.2–4.8)
LYMPHOCYTES NFR BLD AUTO: 21.2 %
LYMPHOCYTES NFR BLD AUTO: 27.5 %
MCH RBC QN AUTO: 28.8 PG (ref 26–34)
MCH RBC QN AUTO: 29.1 PG (ref 26–34)
MCHC RBC AUTO-ENTMCNC: 32.6 G/DL (ref 32–36)
MCHC RBC AUTO-ENTMCNC: 33.5 G/DL (ref 32–36)
MCV RBC AUTO: 86 FL (ref 80–100)
MCV RBC AUTO: 89 FL (ref 80–100)
MONOCYTES # BLD AUTO: 0.4 X10*3/UL (ref 0.1–1)
MONOCYTES # BLD AUTO: 0.55 X10*3/UL (ref 0.1–1)
MONOCYTES NFR BLD AUTO: 7.5 %
MONOCYTES NFR BLD AUTO: 9.1 %
NEUTROPHILS # BLD AUTO: 3.58 X10*3/UL (ref 1.2–7.7)
NEUTROPHILS # BLD AUTO: 3.58 X10*3/UL (ref 1.2–7.7)
NEUTROPHILS NFR BLD AUTO: 59.3 %
NEUTROPHILS NFR BLD AUTO: 67.3 %
NRBC BLD-RTO: 0 /100 WBCS (ref 0–0)
NRBC BLD-RTO: 0 /100 WBCS (ref 0–0)
PHOSPHATE SERPL-MCNC: 3.2 MG/DL (ref 2.5–4.9)
PLATELET # BLD AUTO: 233 X10*3/UL (ref 150–450)
PLATELET # BLD AUTO: ABNORMAL 10*3/UL
POTASSIUM SERPL-SCNC: 3.7 MMOL/L (ref 3.5–5.3)
PROT SERPL-MCNC: 7.8 G/DL (ref 6.4–8.2)
RBC # BLD AUTO: 4.57 X10*6/UL (ref 4–5.2)
RBC # BLD AUTO: 4.79 X10*6/UL (ref 4–5.2)
SODIUM SERPL-SCNC: 141 MMOL/L (ref 136–145)
WBC # BLD AUTO: 5.3 X10*3/UL (ref 4.4–11.3)
WBC # BLD AUTO: 6 X10*3/UL (ref 4.4–11.3)

## 2024-09-11 PROCEDURE — 99215 OFFICE O/P EST HI 40 MIN: CPT | Performed by: STUDENT IN AN ORGANIZED HEALTH CARE EDUCATION/TRAINING PROGRAM

## 2024-09-11 PROCEDURE — 36415 COLL VENOUS BLD VENIPUNCTURE: CPT

## 2024-09-11 PROCEDURE — 36415 COLL VENOUS BLD VENIPUNCTURE: CPT | Performed by: NURSE PRACTITIONER

## 2024-09-11 PROCEDURE — 36415 COLL VENOUS BLD VENIPUNCTURE: CPT | Performed by: STUDENT IN AN ORGANIZED HEALTH CARE EDUCATION/TRAINING PROGRAM

## 2024-09-11 ASSESSMENT — ENCOUNTER SYMPTOMS
HEADACHES: 0
FEVER: 0
DIARRHEA: 0
ABDOMINAL PAIN: 0
FREQUENCY: 0
SCLERAL ICTERUS: 0
ADENOPATHY: 0
CONSTIPATION: 0
SHORTNESS OF BREATH: 0
CHILLS: 0
COUGH: 0
NAUSEA: 0
FATIGUE: 1
APPETITE CHANGE: 0
BRUISES/BLEEDS EASILY: 0

## 2024-09-11 ASSESSMENT — PAIN SCALES - GENERAL: PAINLEVEL: 0-NO PAIN

## 2024-09-11 NOTE — PROGRESS NOTES
Massage Therapy / Acupuncture Note:  I visited with Cayetano and Mom briefly today.  Both were in good spirits.  Both explained that Cayetano was back for another blood draw because something came back abnormal.  Both stated that it was going to be fine and that it was probably a fluke.  I will continue to check in.

## 2024-09-11 NOTE — ADDENDUM NOTE
Encounter addended by: Eron Garcai MD on: 9/11/2024 12:42 PM   Actions taken: Visit diagnoses modified, Order list changed, Diagnosis association updated

## 2024-09-11 NOTE — PROGRESS NOTES
Patient ID: Cayetano Crooks is a 24 y.o. female.  Referring Physician: No referring provider defined for this encounter.  Primary Care Provider: No primary care provider on file.    Date of Service:  9/11/2024    SUBJECTIVE:    History of Present Illness:  Cayetano is a 24  yr old female with history of APML, treated as per [id number].  Completed therapy on 6/20/22. Here today for 28 month off therapy visit.  She is accompanied by her mother    Cayetano has been doing well, recently underwent allergy testing and was started on daily cetrizine  and nasal steroid with improvement in her symptoms.    [place], [place] on Monday. She continues to work in  with some fatigue during the day.  Just tired does not require a nap.  She has noticed some mild brusing not sure how they come up.  They  tend to  take some time to heal.  But no outright bleeding, nose or large bruising.  She has normal menstrual cycles, takes Naproxen 500mg PRN for prevention of  heavy bleeding cramping .  Starts one day before and prn during cycle/  She is up to date for her eye doctor and dental visits had some teeth pulled two back top molars and a wisdom tooth last month.    She has been experiancing a lot of headaches her pcp started her on prn  rizotriptan for headaches that started about a month ago without much improvement.    Her BGT on the Bmp this morning was low but she reports no symptoms of low blood sugar. In fact this mornings lab was not a fasting sugar, had eaten large iced cofee and donouts  this morning prior to her  lab draw.  She has not eaten anything since.      No other concerns at this time.        Oncology History:    Oncology History    No history exists.       Past Medical / Family / Social History:  Past Medical, Family, and Social History reviewed and unchanged since the last visit.    Review of Systems   Constitutional:  Positive for fatigue. Negative for appetite change,  "chills and fever.   HENT:   Negative for lump/mass, mouth sores and nosebleeds.    Eyes:  Negative for icterus.   Respiratory:  Negative for cough and shortness of breath.    Cardiovascular:  Negative for chest pain.   Gastrointestinal:  Negative for abdominal pain, constipation, diarrhea and nausea.   Genitourinary:  Negative for frequency.    Skin:  Negative for rash.   Neurological:  Negative for headaches.   Hematological:  Negative for adenopathy. Does not bruise/bleed easily.         OBJECTIVE:    ../78   Pulse 78   Ht 1.727 m (5' 8\")   Wt 81 kg (178 lb 9.2 oz)   LMP 08/22/2024 (Approximate)   BMI 27.15 kg/m²      Physical Exam  Constitutional:       Appearance: Normal appearance.   HENT:      Head: Normocephalic and atraumatic.      Nose: Nose normal.      Mouth/Throat:      Mouth: Mucous membranes are moist.      Pharynx: Oropharynx is clear.   Eyes:      Extraocular Movements: Extraocular movements intact.      Pupils: Pupils are equal, round, and reactive to light.   Cardiovascular:      Rate and Rhythm: Normal rate and regular rhythm.   Pulmonary:      Effort: Pulmonary effort is normal.      Breath sounds: Normal breath sounds. No wheezing or rhonchi.   Abdominal:      General: Bowel sounds are normal.      Palpations: Abdomen is soft.      Tenderness: There is no abdominal tenderness. There is no guarding.   Musculoskeletal:         General: Normal range of motion.      Cervical back: Normal range of motion and neck supple.   Skin:     General: Skin is warm.      Findings: No rash.   Neurological:      General: No focal deficit present.      Mental Status: She is alert and oriented to person, place, and time.   Psychiatric:         Mood and Affect: Mood normal.       Laboratory:  ..  Hospital Outpatient Visit on 09/11/2024   Component Date Value Ref Range Status    Albumin 09/11/2024 4.9  3.4 - 5.0 g/dL Final    Bilirubin, Total 09/11/2024 0.7  0.0 - 1.2 mg/dL Final    Bilirubin, Direct " 09/11/2024 0.1  0.0 - 0.3 mg/dL Final    Alkaline Phosphatase 09/11/2024 71  33 - 110 U/L Final    ALT 09/11/2024 8  7 - 45 U/L Final    Patients treated with Sulfasalazine may generate falsely decreased results for ALT.    AST 09/11/2024 14  9 - 39 U/L Final    Total Protein 09/11/2024 7.8  6.4 - 8.2 g/dL Final    WBC 09/11/2024 5.3  4.4 - 11.3 x10*3/uL Final    nRBC 09/11/2024 0.0  0.0 - 0.0 /100 WBCs Final    RBC 09/11/2024 4.79  4.00 - 5.20 x10*6/uL Final    Hemoglobin 09/11/2024 13.8  12.0 - 16.0 g/dL Final    Hematocrit 09/11/2024 41.2  36.0 - 46.0 % Final    MCV 09/11/2024 86  80 - 100 fL Final    MCH 09/11/2024 28.8  26.0 - 34.0 pg Final    MCHC 09/11/2024 33.5  32.0 - 36.0 g/dL Final    RDW 09/11/2024 11.9  11.5 - 14.5 % Final    Platelets 09/11/2024    Final    PLATELET CLUMPS PRECLUDE QUANTITATION. PLATELET ESTIMATE APPEARS DECREASED. Please redraw in Blue top tube.    Neutrophils % 09/11/2024 67.3  40.0 - 80.0 % Final    Immature Granulocytes %, Automated 09/11/2024 0.4  0.0 - 0.9 % Final    Immature Granulocyte Count (IG) includes promyelocytes, myelocytes and metamyelocytes but does not include bands. Percent differential counts (%) should be interpreted in the context of the absolute cell counts (cells/UL).    Lymphocytes % 09/11/2024 21.2  13.0 - 44.0 % Final    Monocytes % 09/11/2024 7.5  2.0 - 10.0 % Final    Eosinophils % 09/11/2024 3.2  0.0 - 6.0 % Final    Basophils % 09/11/2024 0.4  0.0 - 2.0 % Final    Neutrophils Absolute 09/11/2024 3.58  1.20 - 7.70 x10*3/uL Final    Percent differential counts (%) should be interpreted in the context of the absolute cell counts (cells/uL).    Immature Granulocytes Absolute, Au* 09/11/2024 0.02  0.00 - 0.70 x10*3/uL Final    Lymphocytes Absolute 09/11/2024 1.13 (L)  1.20 - 4.80 x10*3/uL Final    Monocytes Absolute 09/11/2024 0.40  0.10 - 1.00 x10*3/uL Final    Eosinophils Absolute 09/11/2024 0.17  0.00 - 0.70 x10*3/uL Final    Basophils Absolute 09/11/2024  0.02  0.00 - 0.10 x10*3/uL Final    Glucose 09/11/2024 43 (LL)  74 - 99 mg/dL Final    Sodium 09/11/2024 141  136 - 145 mmol/L Final    Potassium 09/11/2024 3.7  3.5 - 5.3 mmol/L Final    Chloride 09/11/2024 105  98 - 107 mmol/L Final    Bicarbonate 09/11/2024 27  21 - 32 mmol/L Final    Anion Gap 09/11/2024 13  10 - 20 mmol/L Final    Urea Nitrogen 09/11/2024 8  6 - 23 mg/dL Final    Creatinine 09/11/2024 0.72  0.50 - 1.05 mg/dL Final    eGFR 09/11/2024 >90  >60 mL/min/1.73m*2 Final    Calculations of estimated GFR are performed using the 2021 CKD-EPI Study Refit equation without the race variable for the IDMS-Traceable creatinine methods.  https://jasn.asnjournals.org/content/early/2021/09/22/ASN.8346732072    Calcium 09/11/2024 10.0  8.6 - 10.6 mg/dL Final    Phosphorus 09/11/2024 3.2  2.5 - 4.9 mg/dL Final    The performance characteristics of phosphorus testing in heparinized plasma have been validated by the individual  laboratory site where testing is performed. Testing on heparinized plasma is not approved by the FDA; however, such approval is not necessary.          ASSESSMENT and PLAN:    Cayetano is a 24yr old female with history of APML, treated as per [id number].  Completed therapy on 6/20/22. Here today for 27 month off therapy visit.     Cayetano is well appearing on exam today. Her counts are all wnl. No new concerns.   PML::RANJIT RT-PCR Results: Pending   We will send RT-PCR for her next visit.    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 Completed Induction therapy on 11/23/21.   - End of Induction BM bx on 11/23/21 in both morphologic and molecular remission (PML- RANJIT copies undetectable). PML-RANJIT FISH by peripheral blood negative after two consolidation cycles.   - Last day of chemotherapy was 6/20/22.   - PB PML-RANJIT RQ-PCR sent 8/30/23 negative. FISH sent 3/27/23 was  negative.    - Today: PML::RANJIT RT-PCR Results: NEGATIVE    - Will continue with every 3 month visits      CNS:  - Headaches: . No recent migraines. Following with PCP     Ophtho:  - H/O subconjunctival hemorrhage. Wears glasses for reading and driving.   - Scheduled to see Ophtho again on January 31      CV:   - Continue with every 5 years per COG guidelines, next due 5/2028       GYN:   - Followed by TAMMY who reports she has good ovarian reserve function, follows with GYN.      RTC: in 3 months (12/11/24) for labs and physical exam.

## 2024-09-17 LAB
CHROM ANALY OVERALL INTERP-IMP: NORMAL
COMMENTS - MP RESULT TYPE: NORMAL
ELECTRONICALLY COSIGNED BY CYTOGENETICS: NORMAL
ELECTRONICALLY SIGNED BY CYTOGENETICS: NORMAL
FISH ISCN RESULTS: NORMAL
SCAN RESULT: NORMAL

## 2024-10-16 ENCOUNTER — APPOINTMENT (OUTPATIENT)
Dept: PEDIATRIC HEMATOLOGY/ONCOLOGY | Facility: HOSPITAL | Age: 24
End: 2024-10-16
Payer: COMMERCIAL

## 2024-12-11 ENCOUNTER — HOSPITAL ENCOUNTER (OUTPATIENT)
Dept: PEDIATRIC HEMATOLOGY/ONCOLOGY | Facility: HOSPITAL | Age: 24
Discharge: HOME | End: 2024-12-11
Payer: COMMERCIAL

## 2024-12-11 VITALS
HEIGHT: 67 IN | HEART RATE: 85 BPM | DIASTOLIC BLOOD PRESSURE: 72 MMHG | BODY MASS INDEX: 28.48 KG/M2 | WEIGHT: 181.44 LBS | TEMPERATURE: 98.6 F | SYSTOLIC BLOOD PRESSURE: 129 MMHG | RESPIRATION RATE: 18 BRPM

## 2024-12-11 DIAGNOSIS — N64.52 NIPPLE DISCHARGE: ICD-10-CM

## 2024-12-11 DIAGNOSIS — C92.40 ACUTE PROMYELOCYTIC LEUKEMIA NOT HAVING ACHIEVED REMISSION (MULTI): Primary | ICD-10-CM

## 2024-12-11 DIAGNOSIS — C92.41 ACUTE PROMYELOCYTIC LEUKEMIA IN REMISSION (MULTI): ICD-10-CM

## 2024-12-11 DIAGNOSIS — H52.03 HYPEROPIA OF BOTH EYES: Primary | ICD-10-CM

## 2024-12-11 LAB
ALBUMIN SERPL BCP-MCNC: 4.5 G/DL (ref 3.4–5)
ALP SERPL-CCNC: 64 U/L (ref 33–110)
ALT SERPL W P-5'-P-CCNC: 9 U/L (ref 7–45)
ANION GAP SERPL CALC-SCNC: 12 MMOL/L (ref 10–20)
AST SERPL W P-5'-P-CCNC: 10 U/L (ref 9–39)
B-HCG SERPL-ACNC: <3 MIU/ML
BASOPHILS # BLD AUTO: 0.02 X10*3/UL (ref 0–0.1)
BASOPHILS NFR BLD AUTO: 0.5 %
BILIRUB DIRECT SERPL-MCNC: 0.1 MG/DL (ref 0–0.3)
BILIRUB SERPL-MCNC: 0.7 MG/DL (ref 0–1.2)
BUN SERPL-MCNC: 8 MG/DL (ref 6–23)
CALCIUM SERPL-MCNC: 9.9 MG/DL (ref 8.6–10.6)
CHLORIDE SERPL-SCNC: 105 MMOL/L (ref 98–107)
CO2 SERPL-SCNC: 29 MMOL/L (ref 21–32)
CREAT SERPL-MCNC: 0.73 MG/DL (ref 0.5–1.05)
EGFRCR SERPLBLD CKD-EPI 2021: >90 ML/MIN/1.73M*2
EOSINOPHIL # BLD AUTO: 0.07 X10*3/UL (ref 0–0.7)
EOSINOPHIL NFR BLD AUTO: 1.6 %
ERYTHROCYTE [DISTWIDTH] IN BLOOD BY AUTOMATED COUNT: 11.7 % (ref 11.5–14.5)
GLUCOSE SERPL-MCNC: 71 MG/DL (ref 74–99)
HCT VFR BLD AUTO: 40.1 % (ref 36–46)
HGB BLD-MCNC: 13.7 G/DL (ref 12–16)
IMM GRANULOCYTES # BLD AUTO: 0.02 X10*3/UL (ref 0–0.7)
IMM GRANULOCYTES NFR BLD AUTO: 0.5 % (ref 0–0.9)
LYMPHOCYTES # BLD AUTO: 1.26 X10*3/UL (ref 1.2–4.8)
LYMPHOCYTES NFR BLD AUTO: 29 %
MCH RBC QN AUTO: 29.1 PG (ref 26–34)
MCHC RBC AUTO-ENTMCNC: 34.2 G/DL (ref 32–36)
MCV RBC AUTO: 85 FL (ref 80–100)
MONOCYTES # BLD AUTO: 0.37 X10*3/UL (ref 0.1–1)
MONOCYTES NFR BLD AUTO: 8.5 %
NEUTROPHILS # BLD AUTO: 2.6 X10*3/UL (ref 1.2–7.7)
NEUTROPHILS NFR BLD AUTO: 59.9 %
NRBC BLD-RTO: 0 /100 WBCS (ref 0–0)
PLATELET # BLD AUTO: 293 X10*3/UL (ref 150–450)
POTASSIUM SERPL-SCNC: 4.2 MMOL/L (ref 3.5–5.3)
PROLACTIN SERPL-MCNC: 12.5 UG/L (ref 3–20)
PROT SERPL-MCNC: 7.3 G/DL (ref 6.4–8.2)
RBC # BLD AUTO: 4.71 X10*6/UL (ref 4–5.2)
SODIUM SERPL-SCNC: 142 MMOL/L (ref 136–145)
WBC # BLD AUTO: 4.3 X10*3/UL (ref 4.4–11.3)

## 2024-12-11 PROCEDURE — 85025 COMPLETE CBC W/AUTO DIFF WBC: CPT | Performed by: NURSE PRACTITIONER

## 2024-12-11 PROCEDURE — 84702 CHORIONIC GONADOTROPIN TEST: CPT | Performed by: NURSE PRACTITIONER

## 2024-12-11 PROCEDURE — 84146 ASSAY OF PROLACTIN: CPT | Performed by: PEDIATRICS

## 2024-12-11 PROCEDURE — 36415 COLL VENOUS BLD VENIPUNCTURE: CPT | Performed by: NURSE PRACTITIONER

## 2024-12-11 PROCEDURE — 88271 CYTOGENETICS DNA PROBE: CPT | Performed by: NURSE PRACTITIONER

## 2024-12-11 PROCEDURE — 80053 COMPREHEN METABOLIC PANEL: CPT | Performed by: NURSE PRACTITIONER

## 2024-12-11 ASSESSMENT — ENCOUNTER SYMPTOMS
LOSS OF SENSATION IN FEET: 0
DEPRESSION: 0
OCCASIONAL FEELINGS OF UNSTEADINESS: 0

## 2024-12-11 ASSESSMENT — PAIN SCALES - GENERAL: PAINLEVEL_OUTOF10: 0-NO PAIN

## 2024-12-11 ASSESSMENT — COLUMBIA-SUICIDE SEVERITY RATING SCALE - C-SSRS
6. HAVE YOU EVER DONE ANYTHING, STARTED TO DO ANYTHING, OR PREPARED TO DO ANYTHING TO END YOUR LIFE?: NO
1. IN THE PAST MONTH, HAVE YOU WISHED YOU WERE DEAD OR WISHED YOU COULD GO TO SLEEP AND NOT WAKE UP?: NO
2. HAVE YOU ACTUALLY HAD ANY THOUGHTS OF KILLING YOURSELF?: NO

## 2024-12-11 NOTE — PROGRESS NOTES
Patient ID: Cayetano Crooks is a 24 y.o. female.  Referring Physician: Giancarlo Pulido, APRN-CNP  [address] [place] [address]  Primary Care Provider: No primary care provider on file.    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  Completed therapy on 6/20/22. Here today for 30 month off therapy visit.      Cayetano is in good spirits at today's visit. She shares that she continues to [place] a few days a week. Since her last visit went on a trip to the [place], shares that she had a wonderful time.  Overall, Cayetano reports good energy without concerns for fatigue. No recent headaches or dizziness.  Cayetano has not demonstrated any signs or symptoms or bleeding including bruises, hematochezia, petechiae or bloody gums. Cayetano has been making an effort to walk more and choice healthier food intake options. No recent weight loss, night sweats or concerns for lumps or bumps. No abdominal pain or complaints for joint pain or discomfort.     Of concern, Cayetano shares that over the course of the past 2 months she has begun lactating, left breast > right breast. Cayetano reports have to express milk to relieve discomfort.  Cayetano's breast have increased 2 cup sizes during this time period. She has taken multiple pregnancy tests, negative to date. Last menses started 12/6/24 and ended 12/10/24. Denies any erythema to breast or new dimpling, no changes to nipples. She recently saw her PCP regarding this issue. Referral will be placed today for adult reproductive endocrinology.       Oncology History:    Oncology History Overview Note   Completed Induction therapy as per AAML 1331 on 11/23.   12/6/21: Mediport placement by Dr Weber.  12/7/21: Start of Cycle 1 Consolidation therapy as per [id number] and received Arsenic. ATRA has not yet arrived from [facility] pharmacy  12/13/21: Mediport  revision in OR. Did not receive today's dose of Arsenic r/t MP revision, plan to make up on the back end. Has not yet received ATRA from [facility].   12/16/21: Started ATRA starting pm dose 20 mg am, 20 mg pm  12/21/21: Started week 3 of RIK of C1 consolidation  12/28/21: Started week 4 of RIK of C1 consolidation  During cycle 1 of consolidation missed 2 worth of ATRA, no missed doses of RIK.    1/4/22: Started 2 week course of ATRA at 30 mg am and 20 mg pm dose (full dose)  Consolidation Cycle 1 : Didn't miss any doses of RIK, but missed 2 doses of ATRA from first block. Received ATRA 40 mg daily x 12 days (1st block), 50 mg daily (100% dosing) x 10 days and 40 mg x 4 days (dose reduced due ti worsening skin rash).  2/1/22: Start of Consolidation Cycle 2. Missed 2 doses of IV Arsenic r/t winter storm, plan to make these up on the back end. PO Atra started at 100% dosing (30 mg in the AM and 20 mg in the PM).      2/8/22: Cycle 2 Consolidation, week 2, received IV Arsenic. Continues to tolerated PO Atra @100% dosing.   2/15/22: Cycle 2 Consolidation, week 3, received IV Arsenic.   2/22/22: Cycle 2 Consolidation week 4, received IV Arsenic   3/1/22 : Will complete missed doses of RIK on 3/1 and 3/2  3/29/22: Started Cycle 3 Consolidation   5/23/22: Start Cycle 4 Consolidation  6/20/22 : End of Cycle 4 Consolidation, EOT BM          Past Medical / Family / Social History:  Past Medical, Family, and Social History reviewed and unchanged since the last visit.    Review of Systems   Constitutional:  Negative for appetite change, chills and fever.   HENT:   Negative for lump/mass, mouth sores and nosebleeds.    Eyes:  Negative for icterus.   Respiratory:  Negative for cough and shortness of breath.    Cardiovascular:  Negative for chest pain.   Gastrointestinal:  Negative for abdominal pain, constipation, diarrhea and nausea.   Endocrine:        + lactating bilateral breasts with breast enlargement and tenderness  "  Genitourinary:  Negative for frequency.    Skin:  Negative for rash.   Neurological:  Negative for headaches.   Hematological:  Negative for adenopathy. Does not bruise/bleed easily.         OBJECTIVE:    ../72 (BP Location: Right arm, Patient Position: Sitting, BP Cuff Size: Adult)   Pulse 85   Temp 37 °C (98.6 °F) (Tympanic)   Resp 18   Ht 1.711 m (5' 7.36\")   Wt 82.3 kg (181 lb 7 oz)   BMI 28.11 kg/m²      Physical Exam  Constitutional:       Appearance: Normal appearance.   HENT:      Head: Normocephalic and atraumatic.      Nose: Nose normal.      Mouth/Throat:      Mouth: Mucous membranes are moist.      Pharynx: Oropharynx is clear.   Eyes:      Extraocular Movements: Extraocular movements intact.      Pupils: Pupils are equal, round, and reactive to light.   Cardiovascular:      Rate and Rhythm: Normal rate and regular rhythm.   Pulmonary:      Effort: Pulmonary effort is normal.      Breath sounds: Normal breath sounds. No wheezing or rhonchi.   Chest:   Breasts:     Right: Swelling and tenderness present. No skin change.      Left: Swelling and tenderness present. No skin change.      Comments: + bilateral breast tenderness and recent enlargment with + noted lactation, L > R  Abdominal:      General: Bowel sounds are normal.      Palpations: Abdomen is soft.      Tenderness: There is no abdominal tenderness. There is no guarding.   Musculoskeletal:         General: Normal range of motion.      Cervical back: Normal range of motion and neck supple.   Skin:     General: Skin is warm.      Capillary Refill: Capillary refill takes less than 2 seconds.      Findings: No rash.   Neurological:      General: No focal deficit present.      Mental Status: She is alert and oriented to person, place, and time.   Psychiatric:         Mood and Affect: Mood normal.         Laboratory:  ..  Hospital Outpatient Visit on 12/11/2024   Component Date Value Ref Range Status    WBC 12/11/2024 4.3 (L)  4.4 - 11.3 " x10*3/uL Final    nRBC 12/11/2024 0.0  0.0 - 0.0 /100 WBCs Final    RBC 12/11/2024 4.71  4.00 - 5.20 x10*6/uL Final    Hemoglobin 12/11/2024 13.7  12.0 - 16.0 g/dL Final    Hematocrit 12/11/2024 40.1  36.0 - 46.0 % Final    MCV 12/11/2024 85  80 - 100 fL Final    MCH 12/11/2024 29.1  26.0 - 34.0 pg Final    MCHC 12/11/2024 34.2  32.0 - 36.0 g/dL Final    RDW 12/11/2024 11.7  11.5 - 14.5 % Final    Platelets 12/11/2024 293  150 - 450 x10*3/uL Final    Neutrophils % 12/11/2024 59.9  40.0 - 80.0 % Final    Immature Granulocytes %, Automated 12/11/2024 0.5  0.0 - 0.9 % Final    Immature Granulocyte Count (IG) includes promyelocytes, myelocytes and metamyelocytes but does not include bands. Percent differential counts (%) should be interpreted in the context of the absolute cell counts (cells/UL).    Lymphocytes % 12/11/2024 29.0  13.0 - 44.0 % Final    Monocytes % 12/11/2024 8.5  2.0 - 10.0 % Final    Eosinophils % 12/11/2024 1.6  0.0 - 6.0 % Final    Basophils % 12/11/2024 0.5  0.0 - 2.0 % Final    Neutrophils Absolute 12/11/2024 2.60  1.20 - 7.70 x10*3/uL Final    Percent differential counts (%) should be interpreted in the context of the absolute cell counts (cells/uL).    Immature Granulocytes Absolute, Au* 12/11/2024 0.02  0.00 - 0.70 x10*3/uL Final    Lymphocytes Absolute 12/11/2024 1.26  1.20 - 4.80 x10*3/uL Final    Monocytes Absolute 12/11/2024 0.37  0.10 - 1.00 x10*3/uL Final    Eosinophils Absolute 12/11/2024 0.07  0.00 - 0.70 x10*3/uL Final    Basophils Absolute 12/11/2024 0.02  0.00 - 0.10 x10*3/uL Final    Glucose 12/11/2024 71 (L)  74 - 99 mg/dL Final    Sodium 12/11/2024 142  136 - 145 mmol/L Final    Potassium 12/11/2024 4.2  3.5 - 5.3 mmol/L Final    Chloride 12/11/2024 105  98 - 107 mmol/L Final    Bicarbonate 12/11/2024 29  21 - 32 mmol/L Final    Anion Gap 12/11/2024 12  10 - 20 mmol/L Final    Urea Nitrogen 12/11/2024 8  6 - 23 mg/dL Final    Creatinine 12/11/2024 0.73  0.50 - 1.05 mg/dL Final     eGFR 12/11/2024 >90  >60 mL/min/1.73m*2 Final    Calculations of estimated GFR are performed using the 2021 CKD-EPI Study Refit equation without the race variable for the IDMS-Traceable creatinine methods.  https://jasn.asnjournals.org/content/early/2021/09/22/ASN.9807061440    Calcium 12/11/2024 9.9  8.6 - 10.6 mg/dL Final    Albumin 12/11/2024 4.5  3.4 - 5.0 g/dL Final    Bilirubin, Total 12/11/2024 0.7  0.0 - 1.2 mg/dL Final    Bilirubin, Direct 12/11/2024 0.1  0.0 - 0.3 mg/dL Final    Alkaline Phosphatase 12/11/2024 64  33 - 110 U/L Final    ALT 12/11/2024 9  7 - 45 U/L Final    Patients treated with Sulfasalazine may generate falsely decreased results for ALT.    AST 12/11/2024 10  9 - 39 U/L Final    Total Protein 12/11/2024 7.3  6.4 - 8.2 g/dL Final    HCG, Beta-Quantitative 12/11/2024 <3  <5 mIU/mL Final    Prolactin 12/11/2024 12.5  3.0 - 20.0 ug/L Final          ASSESSMENT and PLAN:    Cayetano is a 24yr old female with history of APML, treated as per [id number].  Completed therapy on 6/20/22. Here today for 30 month off therapy visit.     Cayetano is well appearing on exam today. Her counts are all wnl. No evidence of disease on physical exam. No new concerns. PML::RANJIT RT-PCR Results: Pending     Of note, referral placed today to reproductive endocrinologist Dr. Viviana Prieto to address current concerns with new onset lactating in setting of non-pregnant state. HcG serum  level today <3 and prolactin 12.5.       ONC: Treated as per [id number], induction complicated by  differentiation syndrome, worsening DIC, and acute pulmonary alveolar hemorrhage requiring PICU level care. Completed Induction therapy on 11/23/21.   - End of Induction BM bx on 11/23/21 in both morphologic and molecular remission (PML- RANJIT copies undetectable). PML-RANJIT FISH by peripheral blood negative after two consolidation cycles.   - Last day of chemotherapy was 6/20/22.   - PB PML-RANJIT RQ-PCR sent 8/30/23 negative. FISH sent 3/27/23 was  negative.    - Today: PML::RANJIT RT-PCR Results: PENDING    - Will continue with every 3 month visits      Ophtho:  - H/O subconjunctival hemorrhage. Wears glasses for reading and driving.   - Follow up appt with Dr. Naveed Morales 1/31/25     CV:   - Continue with every 5 years per COG guidelines, next due 5/2028       GYN:   - Followed by TAMMY who reports she has good ovarian reserve function, follows with GYN.     ENDO:  - Referral made to Dr. Viviana Prieto of adult endocrinology  - Referral phone # [phone]  - 12/11, HcG < 3 (negative) & prolactin 12.5     RTC: in 3 months (3/12/25) for labs and physical exam. Labs ordered including rq-PCR for PML RANJIT; Hematologics - Miscellaneous Genetics Test

## 2024-12-14 ASSESSMENT — ENCOUNTER SYMPTOMS
SHORTNESS OF BREATH: 0
NAUSEA: 0
FEVER: 0
ADENOPATHY: 0
ABDOMINAL PAIN: 0
SCLERAL ICTERUS: 0
BRUISES/BLEEDS EASILY: 0
APPETITE CHANGE: 0
FREQUENCY: 0
CONSTIPATION: 0
DIARRHEA: 0
CHILLS: 0
COUGH: 0
HEADACHES: 0

## 2025-01-08 NOTE — ADDENDUM NOTE
Encounter addended by: Eron Garcia MD on: 1/8/2025 1:41 PM   Actions taken: Visit diagnoses modified, Level of Service modified

## 2025-01-31 ENCOUNTER — APPOINTMENT (OUTPATIENT)
Dept: OPHTHALMOLOGY | Facility: CLINIC | Age: 25
End: 2025-01-31
Payer: MEDICARE

## 2025-02-19 ENCOUNTER — APPOINTMENT (OUTPATIENT)
Dept: OPHTHALMOLOGY | Facility: CLINIC | Age: 25
End: 2025-02-19
Payer: MEDICARE

## 2025-02-19 DIAGNOSIS — H35.62 RETINAL HEMORRHAGE OF LEFT EYE: ICD-10-CM

## 2025-02-19 DIAGNOSIS — H35.63 RETINAL HEMORRHAGE, BOTH EYES: Primary | ICD-10-CM

## 2025-02-19 DIAGNOSIS — H35.63 RETINAL HEMORRHAGE OF BOTH EYES: ICD-10-CM

## 2025-02-19 PROCEDURE — 99213 OFFICE O/P EST LOW 20 MIN: CPT | Performed by: OPHTHALMOLOGY

## 2025-02-19 PROCEDURE — 92250 FUNDUS PHOTOGRAPHY W/I&R: CPT | Performed by: OPHTHALMOLOGY

## 2025-02-19 ASSESSMENT — VISUAL ACUITY
CORRECTION_TYPE: GLASSES
METHOD: SNELLEN - LINEAR
OD_CC: 20/20

## 2025-02-19 ASSESSMENT — CONF VISUAL FIELD
OD_NORMAL: 1
OS_SUPERIOR_NASAL_RESTRICTION: 1
OD_INFERIOR_NASAL_RESTRICTION: 0
OD_INFERIOR_TEMPORAL_RESTRICTION: 0
OD_SUPERIOR_NASAL_RESTRICTION: 0
OD_SUPERIOR_TEMPORAL_RESTRICTION: 0

## 2025-02-19 ASSESSMENT — EXTERNAL EXAM - LEFT EYE: OS_EXAM: NORMAL

## 2025-02-19 ASSESSMENT — TONOMETRY
IOP_METHOD: GOLDMANN APPLANATION
OS_IOP_MMHG: 15
OD_IOP_MMHG: 15

## 2025-02-19 ASSESSMENT — ENCOUNTER SYMPTOMS
EYES NEGATIVE: 1
HEMATOLOGIC/LYMPHATIC NEGATIVE: 1

## 2025-02-19 ASSESSMENT — SLIT LAMP EXAM - LIDS
COMMENTS: NORMAL
COMMENTS: NORMAL

## 2025-02-19 ASSESSMENT — EXTERNAL EXAM - RIGHT EYE: OD_EXAM: NORMAL

## 2025-02-19 NOTE — PROGRESS NOTES
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       Hi quality OCT  scans obtained  signal good    OCT OD - Normal Foveal Contour, No Edema, IS/OS Junction Normal  OCT OS - Normal Foveal Contour, No Edema, IS/OS Junction Normal    additional commnents:    Leukemic retinopathy OU   Hemes have almost completely resolved  Educated patient of this and natural resolution. Continue treatment with heme/onc.  Will monitor. RTC 6 months, sooner PRN    small scotomas are resolving both eyes but worse left eye      Needs fluorescein  angiogram (second)  Visual filed 30-2  (first)       Needs glasses for left eye    FU 1 yr

## 2025-02-27 ENCOUNTER — HOSPITAL ENCOUNTER (OUTPATIENT)
Dept: PEDIATRIC HEMATOLOGY/ONCOLOGY | Facility: HOSPITAL | Age: 25
Discharge: HOME | End: 2025-02-27
Payer: COMMERCIAL

## 2025-02-27 ENCOUNTER — OFFICE VISIT (OUTPATIENT)
Age: 25
End: 2025-02-27
Payer: MEDICARE

## 2025-02-27 VITALS
WEIGHT: 191.14 LBS | DIASTOLIC BLOOD PRESSURE: 76 MMHG | SYSTOLIC BLOOD PRESSURE: 126 MMHG | HEART RATE: 78 BPM | BODY MASS INDEX: 29.62 KG/M2 | TEMPERATURE: 98.3 F

## 2025-02-27 DIAGNOSIS — C92.40 ACUTE PROMYELOCYTIC LEUKEMIA NOT HAVING ACHIEVED REMISSION (MULTI): ICD-10-CM

## 2025-02-27 DIAGNOSIS — N64.52 NIPPLE DISCHARGE: ICD-10-CM

## 2025-02-27 DIAGNOSIS — Z13.29 THYROID DISORDER SCREEN: Primary | ICD-10-CM

## 2025-02-27 LAB
ALBUMIN SERPL BCP-MCNC: 4.9 G/DL (ref 3.4–5)
ALP SERPL-CCNC: 62 U/L (ref 33–110)
ALT SERPL W P-5'-P-CCNC: 11 U/L (ref 7–45)
ANION GAP SERPL CALC-SCNC: 15 MMOL/L (ref 10–20)
AST SERPL W P-5'-P-CCNC: 12 U/L (ref 9–39)
BASOPHILS # BLD AUTO: 0.02 X10*3/UL (ref 0–0.1)
BASOPHILS NFR BLD AUTO: 0.2 %
BILIRUB DIRECT SERPL-MCNC: 0.1 MG/DL (ref 0–0.3)
BILIRUB SERPL-MCNC: 0.5 MG/DL (ref 0–1.2)
BUN SERPL-MCNC: 10 MG/DL (ref 6–23)
CALCIUM SERPL-MCNC: 9.4 MG/DL (ref 8.6–10.6)
CHLORIDE SERPL-SCNC: 105 MMOL/L (ref 98–107)
CO2 SERPL-SCNC: 23 MMOL/L (ref 21–32)
CREAT SERPL-MCNC: 0.72 MG/DL (ref 0.5–1.05)
EGFRCR SERPLBLD CKD-EPI 2021: >90 ML/MIN/1.73M*2
EOSINOPHIL # BLD AUTO: 0.06 X10*3/UL (ref 0–0.7)
EOSINOPHIL NFR BLD AUTO: 0.7 %
ERYTHROCYTE [DISTWIDTH] IN BLOOD BY AUTOMATED COUNT: 12 % (ref 11.5–14.5)
ESTRADIOL SERPL-MCNC: 56 PG/ML
FSH SERPL-ACNC: 4.4 IU/L
GLUCOSE SERPL-MCNC: 80 MG/DL (ref 74–99)
HCT VFR BLD AUTO: 42.2 % (ref 36–46)
HGB BLD-MCNC: 14.9 G/DL (ref 12–16)
IMM GRANULOCYTES # BLD AUTO: 0.03 X10*3/UL (ref 0–0.7)
IMM GRANULOCYTES NFR BLD AUTO: 0.4 % (ref 0–0.9)
LH SERPL-ACNC: 2.1 IU/L
LYMPHOCYTES # BLD AUTO: 1.77 X10*3/UL (ref 1.2–4.8)
LYMPHOCYTES NFR BLD AUTO: 21.4 %
MCH RBC QN AUTO: 30 PG (ref 26–34)
MCHC RBC AUTO-ENTMCNC: 35.3 G/DL (ref 32–36)
MCV RBC AUTO: 85 FL (ref 80–100)
MONOCYTES # BLD AUTO: 0.6 X10*3/UL (ref 0.1–1)
MONOCYTES NFR BLD AUTO: 7.2 %
NEUTROPHILS # BLD AUTO: 5.8 X10*3/UL (ref 1.2–7.7)
NEUTROPHILS NFR BLD AUTO: 70.1 %
NRBC BLD-RTO: 0 /100 WBCS (ref 0–0)
PHOSPHATE SERPL-MCNC: 3.5 MG/DL (ref 2.5–4.9)
PLATELET # BLD AUTO: 242 X10*3/UL (ref 150–450)
POTASSIUM SERPL-SCNC: 3.9 MMOL/L (ref 3.5–5.3)
PROT SERPL-MCNC: 7.5 G/DL (ref 6.4–8.2)
RBC # BLD AUTO: 4.96 X10*6/UL (ref 4–5.2)
SODIUM SERPL-SCNC: 139 MMOL/L (ref 136–145)
T4 FREE SERPL-MCNC: 1.02 NG/DL (ref 0.78–1.48)
TSH SERPL-ACNC: 1.14 MIU/L (ref 0.44–3.98)
WBC # BLD AUTO: 8.3 X10*3/UL (ref 4.4–11.3)

## 2025-02-27 PROCEDURE — 82248 BILIRUBIN DIRECT: CPT | Performed by: NURSE PRACTITIONER

## 2025-02-27 PROCEDURE — 85025 COMPLETE CBC W/AUTO DIFF WBC: CPT | Performed by: NURSE PRACTITIONER

## 2025-02-27 PROCEDURE — 82670 ASSAY OF TOTAL ESTRADIOL: CPT | Performed by: STUDENT IN AN ORGANIZED HEALTH CARE EDUCATION/TRAINING PROGRAM

## 2025-02-27 PROCEDURE — 84439 ASSAY OF FREE THYROXINE: CPT | Performed by: STUDENT IN AN ORGANIZED HEALTH CARE EDUCATION/TRAINING PROGRAM

## 2025-02-27 PROCEDURE — 1036F TOBACCO NON-USER: CPT | Performed by: STUDENT IN AN ORGANIZED HEALTH CARE EDUCATION/TRAINING PROGRAM

## 2025-02-27 PROCEDURE — 84146 ASSAY OF PROLACTIN: CPT | Performed by: STUDENT IN AN ORGANIZED HEALTH CARE EDUCATION/TRAINING PROGRAM

## 2025-02-27 PROCEDURE — 83002 ASSAY OF GONADOTROPIN (LH): CPT | Performed by: STUDENT IN AN ORGANIZED HEALTH CARE EDUCATION/TRAINING PROGRAM

## 2025-02-27 PROCEDURE — 99205 OFFICE O/P NEW HI 60 MIN: CPT | Performed by: STUDENT IN AN ORGANIZED HEALTH CARE EDUCATION/TRAINING PROGRAM

## 2025-02-27 PROCEDURE — 36415 COLL VENOUS BLD VENIPUNCTURE: CPT | Performed by: NURSE PRACTITIONER

## 2025-02-27 PROCEDURE — G2211 COMPLEX E/M VISIT ADD ON: HCPCS | Performed by: STUDENT IN AN ORGANIZED HEALTH CARE EDUCATION/TRAINING PROGRAM

## 2025-02-27 PROCEDURE — 84100 ASSAY OF PHOSPHORUS: CPT | Performed by: NURSE PRACTITIONER

## 2025-02-27 PROCEDURE — 99215 OFFICE O/P EST HI 40 MIN: CPT | Performed by: STUDENT IN AN ORGANIZED HEALTH CARE EDUCATION/TRAINING PROGRAM

## 2025-02-27 PROCEDURE — 84443 ASSAY THYROID STIM HORMONE: CPT | Performed by: STUDENT IN AN ORGANIZED HEALTH CARE EDUCATION/TRAINING PROGRAM

## 2025-02-27 PROCEDURE — 80053 COMPREHEN METABOLIC PANEL: CPT | Performed by: NURSE PRACTITIONER

## 2025-02-27 RX ORDER — NAPROXEN 500 MG/1
500 TABLET ORAL 2 TIMES DAILY
COMMUNITY

## 2025-02-27 ASSESSMENT — PAIN SCALES - GENERAL: PAINLEVEL_OUTOF10: 0-NO PAIN

## 2025-02-27 ASSESSMENT — PATIENT HEALTH QUESTIONNAIRE - PHQ9
2. FEELING DOWN, DEPRESSED OR HOPELESS: NOT AT ALL
SUM OF ALL RESPONSES TO PHQ9 QUESTIONS 1 AND 2: 0
1. LITTLE INTEREST OR PLEASURE IN DOING THINGS: NOT AT ALL

## 2025-02-27 NOTE — PROGRESS NOTES
Endocrinology  02/27/25    History of Present Illness   Cayetano Crooks is a 24 y.o. year old female with medical history of APML s/p tx completion 6/2022 , here for evaluation of nipple discharge.      Oncologist: Dr. Eron Garcia.     In 12/2024 when she was seeing her oncologist the patient endorsed a hx of lactating, L breast > R breast. She cites having to express milk to relieve discomfort. Breast size has increased in that time period. She has taken multiple pregnancy tests, negative at that time. Last menses at the time 12/6-12/10.    Symptoms started between 10-11/2024 but were significantly worse in 12/2024.     Recent labs completed 12/9/24  PRL 13.6  GH 0.1  Total testosterone 18  Free testosterone 3.9    Patient states that she really noticed discharge in December due to spontaneous leakage; prior to that discharge occurred by manipulation of breast. Says she will notice right breast discharge about once a week for a few hours, and it spontaneously resolves; the discharge start with manipulation. The left breast consistently produces discharge with manipulation on a daily basis,, and will leak spontaneously as well (right does not). Since December she has also noticed increase in breast size from cup size C to D.     She is not on any birth control at this time.    Menses: still menstruating with no change in cycle. Menses lasts for 5 days with 4 wk cycles. Takes naproxen for cramping. Maybe somewhat lighter.   Characterization of breast discharge: It looks grayish and white, smooth and milky. Denies blood.   Bilateral/Unilateral: bilaterl, but mostly left  Spontaneous: only left, right must be manipulated  Recent trauma: port placement 1/2022 and removal 8/2023  Contributing meds: none  Headache: sinus headaches between eyes, started November 2024. Denies nausea/emesis 2/2 headaches, have sinus pain frequently.   Vision changes: not recently, but does have L eye floaters 2/2 DIC      Last saw  ophtho a week ago and vision is stable. Does have decreased peripheral vision on one side 2/2 DIC    Has migraines 2-3x/month. Relieved with excedrin, exacerbated by light. Lasts 2-3 hrs. Started 10/2021.    Review of Systems   General: Denies fever or chills   Head: Denies headache or vision changes   Neck: Denies tenderness or lumps   Cardiac: Denies chest pain and palpitations (does have prolonged Qtc)  Respiratory: Denies shortness of breath or cough   GI: Denies abdominal pain, nausea, or vomiting   Extremities: Denies swelling   Skin: Denies rash     Medications     Current Outpatient Medications   Medication Instructions    azelastine (Astelin) 137 mcg (0.1 %) nasal spray instill 1 puff into each nostril twice a day    cetirizine (ZYRTEC) 10 mg, Daily          History     Past Medical History:   Diagnosis Date    Hyperopia     Personal history of leukemia     History of leukemia    Personal history of other specified conditions     History of genetic disorder    Retinal hemorrhage of both eyes        No past surgical history on file.    No family history on file.     Allergies   Allergen Reactions    Penicillins Hives        Social history: denies tobacco, etoh, does use medical marijuana 10 mg daily at bedtime        Physical Exam   /76 (BP Location: Left arm, Patient Position: Sitting, BP Cuff Size: Adult)   Pulse 78   Temp 36.8 °C (98.3 °F)   Wt 86.7 kg (191 lb 2.2 oz)   LMP 01/28/2025 (Exact Date) Comment: Last period 1/28-2/3  BMI 29.62 kg/m²    Constitutional: She is well-developed, well-nourished, and in no distress.  No hyperactivity observed, no rapid speech.   Head: Normocephalic, Atrautmatic  Mouth/Throat: Oropharynx is clear and moist  Eyes: no proptosis, no lid lag. Non-icteric.   Cardiovascular: Normal rate, regular rhythm, normal heart sounds.  Pulmonary/Chest: Effort normal and breath sounds normal. No respiratory distress.  Breast: no palpable masses, areola and nipples are pink no  erythema, skin is smooth no peau d'orange texture. Outer upper quadrant of left breast is tender. No palpable lymphadenopathy. She does have a scar on left inner upper quadrant of left breast from former port placement.  Musculoskeletal: , normal muscle mass, normal muscle tone  Neurological: She is alert. She is not disoriented.    Skin: Skin is warm and moist.   Psychiatric: Appropriate mood and affect    Neida Gandara RN was chaperone for breast exam      Labs and Imaging   12/9/24  PRL 13.6  GH 0.1  Total testosterone 18  Free testosterone 3.9      Assessment and Plan   Cayetano Crooks is a 24 y.o. year old female with medical history of APML s/p tx completion 6/2022 , here for evaluation of galactorrhea.     #Galactorrhea  - Onset of symptoms in fall of 2024 with acute worsening in 12/2024. There is no bloody discharge; discharge is milky to gray. Interestingly menses have remained regular and unchanged which if due to significant hyperprolactinemia one would think menses would become irregular. It is reassuring that vision is stable. Sinus pain is not the kind of headache we would expect with mass effect. Her prior PRL level was normal which is concerning for hook effect. If this PRL results normal we will repeat w/ serial dilutions.  - Will assess TSH and FT4 to ensure no overt hypothyroidism  - Will obtain FSH/LH/E2 to assess hypothalamic-pituitary-gonadal axis.    RTC: 3M       I spent a total of 80 minutes on the date of the service which included preparing to see the patient, face-to-face patient care, completing clinical documentation, obtaining and/or reviewing separately obtained history, performing a medically appropriate examination, counseling and educating the patient/family/caregiver, and ordering medications, tests, or procedures.        Felicia Santoyo MD   of Medicine  Department of Internal Medicine  [facility]  [facility]  Center

## 2025-02-28 LAB — PROLACTIN SERPL-MCNC: 12.2 UG/L (ref 3–20)

## 2025-03-04 ENCOUNTER — APPOINTMENT (OUTPATIENT)
Age: 25
End: 2025-03-04
Payer: MEDICARE

## 2025-03-10 ENCOUNTER — APPOINTMENT (OUTPATIENT)
Dept: PEDIATRIC HEMATOLOGY/ONCOLOGY | Facility: HOSPITAL | Age: 25
End: 2025-03-10
Payer: MEDICARE

## 2025-03-11 DIAGNOSIS — C92.41 ACUTE PROMYELOCYTIC LEUKEMIA IN REMISSION (MULTI): Primary | ICD-10-CM

## 2025-03-11 ASSESSMENT — ENCOUNTER SYMPTOMS
ADENOPATHY: 0
NAUSEA: 0
HEADACHES: 0
DIARRHEA: 0
BRUISES/BLEEDS EASILY: 0
SCLERAL ICTERUS: 0
APPETITE CHANGE: 0
FEVER: 0
ABDOMINAL PAIN: 0
CHILLS: 0
COUGH: 0
SHORTNESS OF BREATH: 0
FREQUENCY: 0
CONSTIPATION: 0

## 2025-03-11 NOTE — PROGRESS NOTES
Patient ID: Cayetano Crooks is a 24 y.o. female.  Referring Physician: Pippa Falcon, APRN-CNP  [address] [place] [address]  Primary Care Provider: No primary care provider on file.    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  Completed therapy on 6/20/22. Here today for 33 month off therapy visit.      Since her last visit, Cayetano reports that she has been doing well. Cayetano reports that she remains with good energy without concerns for fatigue. Cayetano has a history of migraines, today reports no headaches or dizziness. She was started on Rizatriptin by her PCP for intermittent migraines about 2x per month that are stable. She denies any recent s/s bleeding including bruises, hematochezia, petechiae or bloody gums. No recent weight loss, night sweats or concerns for lumps or bumps. No abdominal pain or complaints. She does report that she has been having right hip pain over the past few months. She saw her PCP who recommended that she tries physical therapy in which she has been going twice per week without much change. She is now scheduled for an MRI of her hip next Friday.     Cayetano continues to report that she is having galactorrhea (L>R side). Cayetano saw Dr. Santoyo of endocrinology on 2/27 for evaluation of her galactorrhea and labs were normal and endocrine recommended follow up again in 3 months. Cayetano also followed up with Dr. Morales of ophthalmology on 2/19 for a routine follow up of her history of retinal hemorrhages with no concerns at this time.         Oncology History:    Oncology History Overview Note   Completed Induction therapy as per AAML 1331 on 11/23.   12/6/21: Mediport placement by Dr Weber.  12/7/21: Start of Cycle 1 Consolidation therapy as per [id number] and received Arsenic. ATRA has not yet arrived from [facility]  12/13/21: Mediport revision in OR. Did not  receive today's dose of Arsenic r/t MP revision, plan to make up on the back end. Has not yet received ATRA from [facility] pharmacy.   12/16/21: Started ATRA starting pm dose 20 mg am, 20 mg pm  12/21/21: Started week 3 of RIK of C1 consolidation  12/28/21: Started week 4 of RIK of C1 consolidation  During cycle 1 of consolidation missed 2 worth of ATRA, no missed doses of RIK.    1/4/22: Started 2 week course of ATRA at 30 mg am and 20 mg pm dose (full dose)  Consolidation Cycle 1 : Didn't miss any doses of RIK, but missed 2 doses of ATRA from first block. Received ATRA 40 mg daily x 12 days (1st block), 50 mg daily (100% dosing) x 10 days and 40 mg x 4 days (dose reduced due ti worsening skin rash).  2/1/22: Start of Consolidation Cycle 2. Missed 2 doses of IV Arsenic r/t winter storm, plan to make these up on the back end. PO Atra started at 100% dosing (30 mg in the AM and 20 mg in the PM).      2/8/22: Cycle 2 Consolidation, week 2, received IV Arsenic. Continues to tolerated PO Atra @100% dosing.   2/15/22: Cycle 2 Consolidation, week 3, received IV Arsenic.   2/22/22: Cycle 2 Consolidation week 4, received IV Arsenic   3/1/22 : Will complete missed doses of RIK on 3/1 and 3/2  3/29/22: Started Cycle 3 Consolidation   5/23/22: Start Cycle 4 Consolidation  6/20/22 : End of Cycle 4 Consolidation, EOT BM          Past Medical / Family / Social History:  Past Medical, Family, and Social History reviewed and unchanged since the last visit.    Review of Systems   Constitutional:  Negative for appetite change, chills and fever.   HENT:   Negative for lump/mass, mouth sores and nosebleeds.    Eyes:  Negative for icterus.   Respiratory:  Negative for cough and shortness of breath.    Cardiovascular:  Negative for chest pain.   Gastrointestinal:  Negative for abdominal pain, constipation, diarrhea and nausea.   Endocrine:        + lactating bilateral breasts with breast enlargement and tenderness   Genitourinary:  Negative  for frequency.    Musculoskeletal:  Positive for arthralgias (Right hip pain).   Skin:  Negative for rash.   Neurological:  Negative for headaches.   Hematological:  Negative for adenopathy. Does not bruise/bleed easily.         OBJECTIVE:    LMP: 2/28/25    Physical Exam  Constitutional:       Appearance: Normal appearance.   HENT:      Head: Normocephalic and atraumatic.      Nose: Nose normal.      Mouth/Throat:      Mouth: Mucous membranes are moist.      Pharynx: Oropharynx is clear.   Eyes:      Extraocular Movements: Extraocular movements intact.      Pupils: Pupils are equal, round, and reactive to light.   Cardiovascular:      Rate and Rhythm: Normal rate and regular rhythm.   Pulmonary:      Effort: Pulmonary effort is normal.      Breath sounds: Normal breath sounds. No wheezing, rhonchi or rales.   Abdominal:      General: Bowel sounds are normal.      Palpations: Abdomen is soft.      Tenderness: There is no abdominal tenderness. There is no guarding.   Musculoskeletal:         General: Tenderness (pinpoint tenderness to right hip) present. No swelling, deformity or signs of injury. Normal range of motion.      Cervical back: Normal range of motion and neck supple.   Skin:     General: Skin is warm.      Capillary Refill: Capillary refill takes less than 2 seconds.      Findings: No rash.   Neurological:      General: No focal deficit present.      Mental Status: She is alert and oriented to person, place, and time.   Psychiatric:         Mood and Affect: Mood normal.         Laboratory:  ..  Hospital Outpatient Visit on 03/12/2025   Component Date Value Ref Range Status    WBC 03/12/2025 4.0 (L)  4.4 - 11.3 x10*3/uL Final    nRBC 03/12/2025 0.0  0.0 - 0.0 /100 WBCs Final    RBC 03/12/2025 4.61  4.00 - 5.20 x10*6/uL Final    Hemoglobin 03/12/2025 13.8  12.0 - 16.0 g/dL Final    Hematocrit 03/12/2025 39.6  36.0 - 46.0 % Final    MCV 03/12/2025 86  80 - 100 fL Final    MCH 03/12/2025 29.9  26.0 - 34.0 pg  Final    MCHC 03/12/2025 34.8  32.0 - 36.0 g/dL Final    RDW 03/12/2025 11.8  11.5 - 14.5 % Final    Platelets 03/12/2025 235  150 - 450 x10*3/uL Final    Neutrophils % 03/12/2025 61.6  40.0 - 80.0 % Final    Immature Granulocytes %, Automated 03/12/2025 1.0 (H)  0.0 - 0.9 % Final    Immature Granulocyte Count (IG) includes promyelocytes, myelocytes and metamyelocytes but does not include bands. Percent differential counts (%) should be interpreted in the context of the absolute cell counts (cells/UL).    Lymphocytes % 03/12/2025 26.1  13.0 - 44.0 % Final    Monocytes % 03/12/2025 9.5  2.0 - 10.0 % Final    Eosinophils % 03/12/2025 1.3  0.0 - 6.0 % Final    Basophils % 03/12/2025 0.5  0.0 - 2.0 % Final    Neutrophils Absolute 03/12/2025 2.45  1.20 - 7.70 x10*3/uL Final    Percent differential counts (%) should be interpreted in the context of the absolute cell counts (cells/uL).    Immature Granulocytes Absolute, Au* 03/12/2025 0.04  0.00 - 0.70 x10*3/uL Final    Lymphocytes Absolute 03/12/2025 1.04 (L)  1.20 - 4.80 x10*3/uL Final    Monocytes Absolute 03/12/2025 0.38  0.10 - 1.00 x10*3/uL Final    Eosinophils Absolute 03/12/2025 0.05  0.00 - 0.70 x10*3/uL Final    Basophils Absolute 03/12/2025 0.02  0.00 - 0.10 x10*3/uL Final          ASSESSMENT and PLAN:    Cayetano is a 24yr old female with history of APML, treated as per [id number]. Completed therapy on 6/20/22. Here today for 33 month off therapy visit.     Cayetano is very well appearing on exam today. Her counts are all stable. No evidence of disease on physical exam. PML::RANJIT RT-PCR Results from today 3/12: Pending.     Regarding Cayetano's ongoing galactorrhea, she is going to follow up with endocrine regarding being seen earlier than 3 months as she is due in May 2025. She also was instructed to make an appointment with gynecology for assessment.     ONC: Treated as per [id number], induction complicated by  differentiation syndrome, worsening DIC, and acute  pulmonary alveolar hemorrhage requiring PICU level care. Completed Induction therapy on 11/23/21.   - End of Induction BM bx on 11/23/21 in both morphologic and molecular remission (PML- RANJIT copies undetectable). PML-RANJIT FISH by peripheral blood negative after two consolidation cycles.   - Last day of chemotherapy was 6/20/22.   - PB PML-RANJIT RQ-PCR sent 8/30/23, 12/11/2024 negative. FISH sent 3/27/23 was negative.    - Today (3/12/25): PML::RANJIT RT-PCR Results: PENDING  - If today's PML:RANJIT  negative, she will NOT require any further PML:RANJIT to be sent    - Will follow up in 3 months in June for her 36 month off therapy visit. After her 36 month off therapy visit, she will transition to survivorship clinic.      Ophtho:  - H/O of bilateral subconjunctival hemorrhage. Wears glasses for reading and driving.   - Followed by Dr. Naveed Morales, last seen on 2/19     CV:   - Continue with ECHO every 5 years per COG guidelines, next due 5/2028       GYN:   - Followed by TAMMY who reports she has good ovarian reserve function  - Patient to schedule GYN apt to assess galactorrhea     ENDO:  - Saw Dr. Santoyo of endocrine on 2/27   - Will follow up in May of 2025 - patient to ask about earlier follow-up if needed      RTC: in 3 months (6/4/25) for labs and physical exam. Will only require a CBC (ordered)

## 2025-03-12 ENCOUNTER — HOSPITAL ENCOUNTER (OUTPATIENT)
Dept: PEDIATRIC HEMATOLOGY/ONCOLOGY | Facility: HOSPITAL | Age: 25
Discharge: HOME | End: 2025-03-12
Payer: MEDICARE

## 2025-03-12 VITALS
HEIGHT: 67 IN | DIASTOLIC BLOOD PRESSURE: 82 MMHG | HEART RATE: 74 BPM | SYSTOLIC BLOOD PRESSURE: 128 MMHG | BODY MASS INDEX: 29.27 KG/M2 | RESPIRATION RATE: 20 BRPM | WEIGHT: 186.51 LBS | TEMPERATURE: 97.9 F

## 2025-03-12 DIAGNOSIS — C92.41 ACUTE PROMYELOCYTIC LEUKEMIA IN REMISSION (MULTI): ICD-10-CM

## 2025-03-12 LAB
BASOPHILS # BLD AUTO: 0.02 X10*3/UL (ref 0–0.1)
BASOPHILS NFR BLD AUTO: 0.5 %
EOSINOPHIL # BLD AUTO: 0.05 X10*3/UL (ref 0–0.7)
EOSINOPHIL NFR BLD AUTO: 1.3 %
ERYTHROCYTE [DISTWIDTH] IN BLOOD BY AUTOMATED COUNT: 11.8 % (ref 11.5–14.5)
HCT VFR BLD AUTO: 39.6 % (ref 36–46)
HGB BLD-MCNC: 13.8 G/DL (ref 12–16)
IMM GRANULOCYTES # BLD AUTO: 0.04 X10*3/UL (ref 0–0.7)
IMM GRANULOCYTES NFR BLD AUTO: 1 % (ref 0–0.9)
LYMPHOCYTES # BLD AUTO: 1.04 X10*3/UL (ref 1.2–4.8)
LYMPHOCYTES NFR BLD AUTO: 26.1 %
MCH RBC QN AUTO: 29.9 PG (ref 26–34)
MCHC RBC AUTO-ENTMCNC: 34.8 G/DL (ref 32–36)
MCV RBC AUTO: 86 FL (ref 80–100)
MONOCYTES # BLD AUTO: 0.38 X10*3/UL (ref 0.1–1)
MONOCYTES NFR BLD AUTO: 9.5 %
NEUTROPHILS # BLD AUTO: 2.45 X10*3/UL (ref 1.2–7.7)
NEUTROPHILS NFR BLD AUTO: 61.6 %
NRBC BLD-RTO: 0 /100 WBCS (ref 0–0)
PLATELET # BLD AUTO: 235 X10*3/UL (ref 150–450)
RBC # BLD AUTO: 4.61 X10*6/UL (ref 4–5.2)
WBC # BLD AUTO: 4 X10*3/UL (ref 4.4–11.3)

## 2025-03-12 PROCEDURE — 85025 COMPLETE CBC W/AUTO DIFF WBC: CPT | Performed by: NURSE PRACTITIONER

## 2025-03-12 PROCEDURE — 36415 COLL VENOUS BLD VENIPUNCTURE: CPT | Performed by: NURSE PRACTITIONER

## 2025-03-12 RX ORDER — RIZATRIPTAN BENZOATE 5 MG/1
5 TABLET ORAL ONCE AS NEEDED
COMMUNITY

## 2025-03-12 ASSESSMENT — ENCOUNTER SYMPTOMS
DEPRESSION: 0
LOSS OF SENSATION IN FEET: 0
OCCASIONAL FEELINGS OF UNSTEADINESS: 0
ARTHRALGIAS: 1

## 2025-03-12 ASSESSMENT — PAIN SCALES - GENERAL: PAINLEVEL_OUTOF10: 0-NO PAIN

## 2025-03-12 NOTE — PROGRESS NOTES
Massage Therapy / Acupuncture Note:  I visited with Cayetano briefly today.  She was in good spirits and stated she will be going to survivorship soon.  I will continue to check in.

## 2025-03-17 DIAGNOSIS — N64.52 NIPPLE DISCHARGE: Primary | ICD-10-CM

## 2025-03-17 LAB
COMMENTS - MP RESULT TYPE: NORMAL
SCAN RESULT: NORMAL

## 2025-03-25 ENCOUNTER — APPOINTMENT (OUTPATIENT)
Dept: ORTHOPEDIC SURGERY | Facility: HOSPITAL | Age: 25
End: 2025-03-25
Payer: MEDICARE

## 2025-04-07 ENCOUNTER — HOSPITAL ENCOUNTER (OUTPATIENT)
Dept: RADIOLOGY | Facility: HOSPITAL | Age: 25
Discharge: HOME | End: 2025-04-07
Payer: MEDICARE

## 2025-04-07 ENCOUNTER — APPOINTMENT (OUTPATIENT)
Dept: ORTHOPEDIC SURGERY | Facility: HOSPITAL | Age: 25
End: 2025-04-07
Payer: MEDICARE

## 2025-04-07 VITALS — BODY MASS INDEX: 28.04 KG/M2 | WEIGHT: 185 LBS | HEIGHT: 68 IN

## 2025-04-07 DIAGNOSIS — Q65.89 HIP DYSPLASIA (HHS-HCC): Primary | ICD-10-CM

## 2025-04-07 DIAGNOSIS — M25.551 RIGHT HIP PAIN: Primary | ICD-10-CM

## 2025-04-07 DIAGNOSIS — M25.551 RIGHT HIP PAIN: ICD-10-CM

## 2025-04-07 PROCEDURE — 99204 OFFICE O/P NEW MOD 45 MIN: CPT | Performed by: ORTHOPAEDIC SURGERY

## 2025-04-07 PROCEDURE — 99214 OFFICE O/P EST MOD 30 MIN: CPT | Performed by: ORTHOPAEDIC SURGERY

## 2025-04-07 PROCEDURE — 3008F BODY MASS INDEX DOCD: CPT | Performed by: ORTHOPAEDIC SURGERY

## 2025-04-07 PROCEDURE — 73502 X-RAY EXAM HIP UNI 2-3 VIEWS: CPT | Mod: RT

## 2025-04-07 PROCEDURE — 73502 X-RAY EXAM HIP UNI 2-3 VIEWS: CPT | Mod: RIGHT SIDE | Performed by: RADIOLOGY

## 2025-04-07 NOTE — PATIENT INSTRUCTIONS
HPI  This is a pleasant 24 y.o. female here today for right hip pain. She states the pain has been going on for approximately 3 months. Atraumatic onset. Pain is constantly at 6/10. Mostly describes the pain posterolateral and occasionally will extend to her posterior thigh. She feels she has limited ROM at the hip and has discomfort while sitting and standing. Has trialed approximately 5-6 weeks of PT and anti-inflammatory medications with minimal to no relief. Still endorsing pain limiting activities of daily living. She notably has a history of APML and is in remission. Endorses prior R iliac crest biopsies but no surgical intervention to R hip.      Past Medical History:   Diagnosis Date    Hyperopia     Personal history of leukemia     History of leukemia    Personal history of other specified conditions     History of genetic disorder    Retinal hemorrhage of both eyes        No past surgical history on file.    Social History     Tobacco Use    Smoking status: Never     Passive exposure: Current    Smokeless tobacco: Never   Vaping Use    Vaping status: Never Used   Substance Use Topics    Alcohol use: Defer    Drug use: Yes     Types: Marijuana     Comment: edibles once per day          ROS  Review of systems reviewed and pertinent positives mentioned in HPI.      PHYSICAL EXAM  There is not  pain with a resisted situp.    There is not abdominal distention or tenderness    The patient's range of motion reveals that they have 90° of hip flexion on the affected side.   ER 40 degrees.   IR 15 degrees  Hip extension to 10°   Abduction to 45°    The patient is 5 out of 5 strength with resisted hip AB, adduction, hamstring and quadriceps testing.    No pain over the hip flexor, ASIS.  No pain over the proximal hamstring, piriformis    Pain Provacation testing:    Positive impingement sign,with a painful arc from 12 to 3:00.  Positive Psoas impingement/Kelly test  Negative instability, Log roll.  Positive subspine  impingement test, which is pain with straight hip flexion.  Negative straight leg raise.  Negative circumduction clunk.    Peritrochanteric space examination:  Tenderness over the no-trochanteric space - Yes  pain over the posterior trochanter - Yes      IMAGING  X-rays reviewed reveal no gross fracture or dislocation. and evidence of femoral acetabular impingement with an alpha angle of 58.8.  Acetabular index of 7.9  with acetabular retroversion.  Lateral center edge of 19.7.    MRI R hip from OSH tearing of the acetabular labrum, small cleft at chondrolabral junction of superior labrum as well as posterosuperior labrum. T2 hyperintense lesion 1.5 x 0.8 cm in sacrum indeterminate.    MRI Sacrum from OSH with small hyperintense T2 signal in lower sacrum likely representing small vertebral body hemangioma. Normal sacroiliac joints.      ASSESSMENT/PLAN  This is a 24 y.o. female patient here today with significant hip pain secondary to Right femoral acetabular impingement, acetabular dysplasia, gluteal tendinopathy. She is retroverted and has posterior undercoverage in conjunction with a CAM deformity.    We are going to obtain a CT of the pelvis for surgical planning secondary to dysplasia described in imaging.  This is a complex surgical procedure involving a combined hip arthroscopy and periacetabular osteotomy.  CT of the pelvis is required for obtaining proper measurements of acetabular version. Will plan for combination clinic visit with Dr. Chandler and our team for further surgical discussion and possible diagnostic injection with Margarita as well. All questions answered.

## 2025-04-14 ENCOUNTER — APPOINTMENT (OUTPATIENT)
Dept: OBSTETRICS AND GYNECOLOGY | Facility: CLINIC | Age: 25
End: 2025-04-14
Payer: MEDICARE

## 2025-04-14 ENCOUNTER — HOSPITAL ENCOUNTER (OUTPATIENT)
Dept: RADIOLOGY | Facility: CLINIC | Age: 25
Discharge: HOME | End: 2025-04-14
Payer: MEDICARE

## 2025-04-14 DIAGNOSIS — Q65.89 HIP DYSPLASIA (HHS-HCC): ICD-10-CM

## 2025-04-14 PROCEDURE — 72192 CT PELVIS W/O DYE: CPT

## 2025-04-14 PROCEDURE — 76377 3D RENDER W/INTRP POSTPROCES: CPT

## 2025-04-15 ENCOUNTER — APPOINTMENT (OUTPATIENT)
Dept: ORTHOPEDIC SURGERY | Facility: CLINIC | Age: 25
End: 2025-04-15
Payer: MEDICARE

## 2025-04-15 ENCOUNTER — APPOINTMENT (OUTPATIENT)
Dept: OBSTETRICS AND GYNECOLOGY | Facility: CLINIC | Age: 25
End: 2025-04-15
Payer: MEDICARE

## 2025-04-30 ENCOUNTER — HOSPITAL ENCOUNTER (OUTPATIENT)
Dept: RADIOLOGY | Facility: EXTERNAL LOCATION | Age: 25
Discharge: HOME | End: 2025-04-30

## 2025-04-30 ENCOUNTER — OFFICE VISIT (OUTPATIENT)
Dept: ORTHOPEDIC SURGERY | Facility: HOSPITAL | Age: 25
End: 2025-04-30
Payer: MEDICARE

## 2025-04-30 ENCOUNTER — HOSPITAL ENCOUNTER (OUTPATIENT)
Dept: RADIOLOGY | Facility: CLINIC | Age: 25
Discharge: HOME | End: 2025-04-30
Payer: MEDICARE

## 2025-04-30 ENCOUNTER — OFFICE VISIT (OUTPATIENT)
Dept: ORTHOPEDIC SURGERY | Facility: CLINIC | Age: 25
End: 2025-04-30
Payer: MEDICARE

## 2025-04-30 DIAGNOSIS — Q65.89 HIP DYSPLASIA (HHS-HCC): Primary | ICD-10-CM

## 2025-04-30 DIAGNOSIS — Q65.89 HIP DYSPLASIA (HHS-HCC): ICD-10-CM

## 2025-04-30 PROCEDURE — 1036F TOBACCO NON-USER: CPT | Performed by: ORTHOPAEDIC SURGERY

## 2025-04-30 PROCEDURE — 99213 OFFICE O/P EST LOW 20 MIN: CPT | Performed by: ORTHOPAEDIC SURGERY

## 2025-04-30 PROCEDURE — 99215 OFFICE O/P EST HI 40 MIN: CPT | Mod: 25 | Performed by: ORTHOPAEDIC SURGERY

## 2025-04-30 PROCEDURE — 72170 X-RAY EXAM OF PELVIS: CPT

## 2025-04-30 PROCEDURE — 2500000004 HC RX 250 GENERAL PHARMACY W/ HCPCS (ALT 636 FOR OP/ED): Performed by: PHYSICIAN ASSISTANT

## 2025-04-30 PROCEDURE — 99215 OFFICE O/P EST HI 40 MIN: CPT | Performed by: ORTHOPAEDIC SURGERY

## 2025-04-30 PROCEDURE — 72170 X-RAY EXAM OF PELVIS: CPT | Performed by: RADIOLOGY

## 2025-04-30 PROCEDURE — 20611 DRAIN/INJ JOINT/BURSA W/US: CPT | Mod: RT | Performed by: PHYSICIAN ASSISTANT

## 2025-04-30 RX ORDER — LIDOCAINE HYDROCHLORIDE 10 MG/ML
4 INJECTION, SOLUTION INFILTRATION; PERINEURAL
Status: COMPLETED | OUTPATIENT
Start: 2025-04-30 | End: 2025-04-30

## 2025-04-30 RX ADMIN — LIDOCAINE HYDROCHLORIDE 4 ML: 10 INJECTION, SOLUTION INFILTRATION; PERINEURAL at 09:33

## 2025-04-30 ASSESSMENT — PAIN DESCRIPTION - DESCRIPTORS: DESCRIPTORS: ACHING

## 2025-04-30 ASSESSMENT — PAIN SCALES - GENERAL: PAINLEVEL_OUTOF10: 6

## 2025-04-30 ASSESSMENT — PAIN - FUNCTIONAL ASSESSMENT: PAIN_FUNCTIONAL_ASSESSMENT: 0-10

## 2025-04-30 NOTE — PROGRESS NOTES
Patient is here today regarding her right hip.  She has seen Dr. Chandler and Dr. Galeas for possible combined hip arthroscopy and JENNIFER.  They requested a diagnostic injection prior.  We proceeded as below.        Patient ID: Cayetano Crooks is a 24 y.o. female.    L Inj/Asp: R hip joint on 4/30/2025 9:33 AM  Indications: pain  Details: 20 G needle, ultrasound-guided anterior approach  Medications: 4 mL lidocaine 10 mg/mL (1 %)  Procedure, treatment alternatives, risks and benefits explained, specific risks discussed. Consent was given by the patient. Immediately prior to procedure a time out was called to verify the correct patient, procedure, equipment, support staff and site/side marked as required. Patient was prepped and draped in the usual sterile fashion.       Patient had 100% relief of her pain after the injection.  She will discuss scheduling surgery due to her response to the injection.      Margarita Hewitt PA-C

## 2025-04-30 NOTE — PROGRESS NOTES
This is a consultation from Dr. Galeas.  This is a 24-year-old female who has had several years of posterior right hip pain.  She did previously treated lymphoma that is now in remission and was initially thinking her hip pain was related to that.  With that now put aside she came in for an evaluation of her hip and was found to have impingement of her right hip joint with a labral tear and significant posterior undercoverage.  She presents today now with me for an evaluation of her retroversion with her parents.  She notes mostly posterior pain.  She has not had a diagnostic injection to her hip yet.  She is scheduled for that later today.    The patients full medical history, surgical history, medications, allergies, family, medical history, social history, and a complete 30 point review of systems is documented in the medical record on the signed, scanned medical intake sheet or reviewed in the history of present illness.    Gen: The patient is alert and oriented ×3, is in no acute distress, and appear their stated age and weight.    Psychiatric: Mood and affect are appropriate.    Eyes: Sclera are white, and pupils are round and symmetric.    ENT: Mucous membranes are moist.     Neck: Supple. Thyroid is midline.    Respiratory: Respirations are nonlabored, chest rise is symmetric.    Cardiac: Rate is regular by palpation of distal pulses.     Abdomen: Nondistended.    Integument: No obvious cutaneous lesions are noted. No signs of lymphangitis. No signs of systemic edema.    Gait and stance examination demonstrate a reciprocal heel toe gait. Trendelenburg sign is negative.    Right hip examination reveals 120 degrees of flexion, 15 degrees of internal rotation, 50 degrees of external rotation, and 50 degrees of abduction. Anterior impingement sign is mildly positive.  Posterior impingement sign is negative. Subspine impingement sign is negative. RHONDA test is negative. Stinchfield test is positive. Devante test is  negative. There is no tenderness to palpation over the pubic symphysis, anterior groin, greater trochanter, or gluteal region. Posterior apprehension is very positive. Anterior apprehension is negative. Hip flexion strength is 5 out of 5. Adductor strength is 5 out of 5. Abduction strength is 5 out of 5 in the lateral position. Pelvic obliquity is level.    Distally, ankle dorsiflexion and plantarflexion as well as great toe extension is 5 out of 5. Sensation is intact to light touch in the tibial, sural, saphenous, superficial peroneal, and deep peroneal nerve distributions. The foot is warm and well-perfused with palpable pedal pulses. There is no obvious edema present. Skin is warm, dry and intact.    Multiple views of her hip and pelvis demonstrate a significantly retroverted right hip socket with an ischial spine sign and about 15 degrees of pure retroversion from neutral on axial CT.  MRI demonstrates labral tear.    24-year-old female with symptomatic right hip retroversion.  She was exquisitely painful and apprehensive on posterior apprehension maneuver today.  She has diagnostic injection scheduled later today.  Will get back to her with scheduling after her injection.      Clinical update: I received word from Dr. Galeas's office she had 100% of her pain relieved by diagnostic injection.  We continued our discussion for combo JENNIFER and hip scope.  We had a detailed discussion regarding the full details of the procedure, the proposed surgical plan, the cutting of the acetabular socket free from the intact pelvis and reorientation. We discussed combined arthroscopic and open approach with Dr. Galeas for decompression of her femoral head with osteochondroplasty and repair of her labrum. We discussed regional anesthesia block combined with general anesthesia. We discussed use of hypotensive anesthesia to minimize blood loss but that blood allogenic blood transfusion is still a possibility. We discussed 3-4 day  inpatient hospital stay. Although a traditional hip arthroscopy would be done outpatient, the complex level of hospital and nursing care needed would necessitate an inpatient stay, including frequent vital checks, monitoring and treatment of hypotension and anemia, IV pain medication, medical co-management, and safety education of ADLs while on crutches with limited weight bearing.  We also discussed 30 pounds foot flat weightbearing on the operative extremity with crutch use for 6 weeks. We discussed that patient will need a wheelchair for short distances possibly for up to 3 months and long distances up to 6 months depending on fatigue and recovery level. The natural history of dysplasia as well as details surrounding the procedure and postoperative recovery course were discussed with the patient and family present.  Plan [facility] 7/14 at [facility].  Nonoperative and operative treatment options were presented to the patient. After discussion, operative treatment was elected. Risks and benefits of surgery were discussed with the patient which include, but are not limited to, death, infection, bleeding, neurologic damage, nonunion, malunion, posttraumatic arthritis, incomplete resolution of symptoms, failure of the operation, and others. The patient understood and elected to proceed.    Natural History reviewed. All questions answered. The patient was in agreement with the plan.      **This note was created using voice recognition software and was not corrected for typographical or grammatical errors.**

## 2025-05-02 ENCOUNTER — HOSPITAL ENCOUNTER (OUTPATIENT)
Facility: HOSPITAL | Age: 25
Setting detail: SURGERY ADMIT
End: 2025-05-02
Attending: ORTHOPAEDIC SURGERY | Admitting: ORTHOPAEDIC SURGERY
Payer: COMMERCIAL

## 2025-05-02 DIAGNOSIS — M25.851 FEMOROACETABULAR IMPINGEMENT OF RIGHT HIP: ICD-10-CM

## 2025-05-02 DIAGNOSIS — M25.051 HEMARTHROSIS OF RIGHT HIP: ICD-10-CM

## 2025-05-02 DIAGNOSIS — S73.191A TEAR OF ACETABULAR LABRUM, RIGHT, INITIAL ENCOUNTER: ICD-10-CM

## 2025-05-02 DIAGNOSIS — Q65.89 CONGENITAL HIP DYSPLASIA (HHS-HCC): Primary | ICD-10-CM

## 2025-05-05 NOTE — PROGRESS NOTES
Patient would like to proceed with operative intervention in the form of a combined hip arthroscopy and periacetabular osteotomy.     The arthroscopic surgery component would comprise a arthroscopy with labral repair loose body removal rim trim subspinous decompression and femoral osteochondroplasty with capsular plication for her instability from a ligamentous laxity standpoint.     Risks of surgery which include but are not limited to bleeding, infection, damage to nerves, damage to blood vessels, blood clots, heterotopic ossification, avascular necrosis, progression to hip replacement, pudendal nerve palsy, iatrogenic instability, incomplete pain relief, capsular labral adhesions, decreased range of motion, risks of anesthesia including heart attack stroke and death were explained to the patient.  They expressed understanding of these risks and wished to proceed with operative intervention.     She underwent a diagnostic injection today that completely alleviated her pain during the diagnostic portion.

## 2025-05-14 ASSESSMENT — ENCOUNTER SYMPTOMS
CHILLS: 0
SORE THROAT: 0
NAUSEA: 0
VOMITING: 0
HEADACHES: 0
FLANK PAIN: 0
FREQUENCY: 0
ANOREXIA: 0
DYSURIA: 0
CONSTIPATION: 0
HEMATURIA: 0
DIARRHEA: 0
ABDOMINAL PAIN: 0
BACK PAIN: 0
FEVER: 0

## 2025-05-15 ENCOUNTER — APPOINTMENT (OUTPATIENT)
Dept: OBSTETRICS AND GYNECOLOGY | Facility: CLINIC | Age: 25
End: 2025-05-15
Payer: MEDICARE

## 2025-05-29 ENCOUNTER — APPOINTMENT (OUTPATIENT)
Age: 25
End: 2025-05-29
Payer: MEDICARE

## 2025-06-10 ASSESSMENT — ENCOUNTER SYMPTOMS
FREQUENCY: 0
ABDOMINAL PAIN: 0
HEADACHES: 0
ADENOPATHY: 0
APPETITE CHANGE: 0
FEVER: 0
COUGH: 0
ARTHRALGIAS: 1
CHILLS: 0
DIARRHEA: 0
NAUSEA: 0
SCLERAL ICTERUS: 0
BRUISES/BLEEDS EASILY: 0
SHORTNESS OF BREATH: 0
CONSTIPATION: 0

## 2025-06-10 NOTE — PROGRESS NOTES
"Patient ID: Cayetano Crooks is a 24 y.o. female.  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  Completed therapy on 6/20/22. Here today for 36 month off therapy visit.      Since her last visit, Cayetano was seen by ortho for complaints of right hip pain, further evaluation of her hip and was found to have impingement of her right hip joint with a labral tear and significant posterior undercoverage. She is scheduled to undergo combined hip arthroscopy and periacetabular osteotomy on 7/7 with Dr. Chandler. At this time, pain has well controlled with intermittent use of either Motrin or Naproxen. Bedsides right hip pain, does not identify any additional joint pain or discomfort.     Since her last visit 3 months ago, Cayetano has been well. Continues to [facility], reports good energy in keeping up with the kids. Remains on Rizatriptin for history of migraines, reports on Sunday had significant migraine with associated vomiting. Took 2 Rizatriptin without relief. Does not identify any triggers. Approximately 3 weeks ago developed double ear infection with associated URI symptoms. Was treated with a course of steroids and doxycycline. Symptoms resolved. Skin without unexplained rashes, bruises or bleeding. No petechiae. Cayetano denies concerns for new lumps or bumps.  No concerns for weight loss or night sweats. Appetite has been good, voiding and stooling without difficulty. Menstrual cycles remain very regular, actually commented that \"they are the most regular they have ever been.\"    Cayetano has a history of galactorrhea (L>R side). At today's visit, galactorrhea has stopped in right breast and has significantly decreased in left breast. Breast size has decreased since last visit, as " she had previously reported that she had increased 2 cup sizes during time of peak galactorrhea. Cayetano is being followed by Dr. Santoyo of endocrinology, will follow up with Dr. Santoyo on 7/22. At this time she is scheduled to see Dr. Byrd of GYN on 7/10, will need to be rescheduled secondary to upcoming surgery. Cayetano is also followed up with Dr. Morales of ophthalmology secondary to her history of retinal hemorrhages, next follow up on 8/15. Cayetano is currently without concerns of changes in vision at this time. Remains with baseline of 2 black floaters in left eye. Cayetano does not notice the floaters of she is using both eyes, floaters are prominent when right eye is covered. Over time, floaters have diminished in severity.       Oncology History:    Oncology History Overview Note   Completed Induction therapy as per AAML 1331 on 11/23.   12/6/21: Mediport placement by Dr Weber.  12/7/21: Start of Cycle 1 Consolidation therapy as per [id number] and received Arsenic. ATRA has not yet arrived from [facility] pharmacy  12/13/21: Mediport revision in OR. Did not receive today's dose of Arsenic r/t MP revision, plan to make up on the back end. Has not yet received ATRA from [facility] pharmacy.   12/16/21: Started ATRA starting pm dose 20 mg am, 20 mg pm  12/21/21: Started week 3 of RIK of C1 consolidation  12/28/21: Started week 4 of RIK of C1 consolidation  During cycle 1 of consolidation missed 2 worth of ATRA, no missed doses of RIK.    1/4/22: Started 2 week course of ATRA at 30 mg am and 20 mg pm dose (full dose)  Consolidation Cycle 1 : Didn't miss any doses of RIK, but missed 2 doses of ATRA from first block. Received ATRA 40 mg daily x 12 days (1st block), 50 mg daily (100% dosing) x 10 days and 40 mg x 4 days (dose reduced due ti worsening skin rash).  2/1/22: Start of Consolidation Cycle 2. Missed 2 doses of IV Arsenic r/t winter storm, plan to make these up on the back end. PO Atra started at 100% dosing (30 mg  in the AM and 20 mg in the PM).      2/8/22: Cycle 2 Consolidation, week 2, received IV Arsenic. Continues to tolerated PO Atra @100% dosing.   2/15/22: Cycle 2 Consolidation, week 3, received IV Arsenic.   2/22/22: Cycle 2 Consolidation week 4, received IV Arsenic   3/1/22 : Will complete missed doses of RIK on 3/1 and 3/2  3/29/22: Started Cycle 3 Consolidation   5/23/22: Start Cycle 4 Consolidation  6/20/22 : End of Cycle 4 Consolidation, EOT BM       No history exists.       Past Medical / Family / Social History:  Past Medical, Family, and Social History reviewed and unchanged since the last visit.    Review of Systems   Constitutional:  Negative for appetite change, chills and fever.   HENT:   Negative for lump/mass, mouth sores and nosebleeds.    Eyes:  Negative for icterus.   Respiratory:  Negative for cough and shortness of breath.    Cardiovascular:  Negative for chest pain.   Gastrointestinal:  Negative for abdominal pain, constipation, diarrhea and nausea.   Endocrine:        + lactating bilateral breasts with breast enlargement and tenderness   Genitourinary:  Negative for frequency.    Musculoskeletal:  Positive for arthralgias (Right hip pain).   Skin:  Negative for rash.   Neurological:  Negative for headaches.   Hematological:  Negative for adenopathy. Does not bruise/bleed easily.         OBJECTIVE:      Physical Exam  Constitutional:       Appearance: Normal appearance.   HENT:      Head: Normocephalic and atraumatic.      Nose: Nose normal.      Mouth/Throat:      Mouth: Mucous membranes are moist.      Pharynx: Oropharynx is clear.   Eyes:      Extraocular Movements: Extraocular movements intact.      Pupils: Pupils are equal, round, and reactive to light.   Cardiovascular:      Rate and Rhythm: Normal rate and regular rhythm.   Pulmonary:      Effort: Pulmonary effort is normal.      Breath sounds: Normal breath sounds. No wheezing, rhonchi or rales.   Abdominal:      General: Bowel sounds are  normal.      Palpations: Abdomen is soft.      Tenderness: There is no abdominal tenderness. There is no guarding.   Musculoskeletal:         General: Tenderness (pinpoint tenderness to right hip) present. No swelling, deformity or signs of injury. Normal range of motion.      Cervical back: Normal range of motion and neck supple.      Comments: Pain to right hip with sitting and standing.    Skin:     General: Skin is warm.      Capillary Refill: Capillary refill takes less than 2 seconds.      Findings: No rash.   Neurological:      General: No focal deficit present.      Mental Status: She is alert and oriented to person, place, and time.   Psychiatric:         Mood and Affect: Mood normal.       Laboratory:  ..  Hospital Outpatient Visit on 06/11/2025   Component Date Value Ref Range Status   • WBC 06/11/2025 3.6 (L)  4.4 - 11.3 x10*3/uL Final   • nRBC 06/11/2025 0.0  0.0 - 0.0 /100 WBCs Final   • RBC 06/11/2025 4.67  4.00 - 5.20 x10*6/uL Final   • Hemoglobin 06/11/2025 13.5  12.0 - 16.0 g/dL Final   • Hematocrit 06/11/2025 39.3  36.0 - 46.0 % Final   • MCV 06/11/2025 84  80 - 100 fL Final   • MCH 06/11/2025 28.9  26.0 - 34.0 pg Final   • MCHC 06/11/2025 34.4  32.0 - 36.0 g/dL Final   • RDW 06/11/2025 11.9  11.5 - 14.5 % Final   • Platelets 06/11/2025 242  150 - 450 x10*3/uL Final   • Neutrophils % 06/11/2025 55.1  40.0 - 80.0 % Final   • Immature Granulocytes %, Automated 06/11/2025 0.0  0.0 - 0.9 % Final    Immature Granulocyte Count (IG) includes promyelocytes, myelocytes and metamyelocytes but does not include bands. Percent differential counts (%) should be interpreted in the context of the absolute cell counts (cells/UL).   • Lymphocytes % 06/11/2025 28.6  13.0 - 44.0 % Final   • Monocytes % 06/11/2025 14.0  2.0 - 10.0 % Final   • Eosinophils % 06/11/2025 2.0  0.0 - 6.0 % Final   • Basophils % 06/11/2025 0.3  0.0 - 2.0 % Final   • Neutrophils Absolute 06/11/2025 1.97  1.20 - 7.70 x10*3/uL Final    Percent  differential counts (%) should be interpreted in the context of the absolute cell counts (cells/uL).   • Immature Granulocytes Absolute, Au* 06/11/2025 0.00  0.00 - 0.70 x10*3/uL Final   • Lymphocytes Absolute 06/11/2025 1.02 (L)  1.20 - 4.80 x10*3/uL Final   • Monocytes Absolute 06/11/2025 0.50  0.10 - 1.00 x10*3/uL Final   • Eosinophils Absolute 06/11/2025 0.07  0.00 - 0.70 x10*3/uL Final   • Basophils Absolute 06/11/2025 0.01  0.00 - 0.10 x10*3/uL Final          ASSESSMENT and PLAN:    Cayetano is a 24yr old female with history of APML, treated as per [id number]. Completed therapy on 6/20/22. Here today for 36 month off therapy visit.     On exam Cayetano is without evidence of disease on physical exam. At today's visit, Cayetano only required a CBC/Diff, results demonstrate stable finding. Mild leukopenia without additional concerning finding. Leukopenia may be secondary to recent illness and use of antibiotics. Cayetano already has follow up appt with endocrine and gyn regarding her ongoing galactorrhea. At today's visit, without concern on physical exam or concerns with CBC, Cayetano will transition to survivorship clinic in 6 months.     ONC: Treated as per [id number], induction complicated by  differentiation syndrome, worsening DIC, and acute pulmonary alveolar hemorrhage requiring PICU level care. Completed Induction therapy on 11/23/21.   - End of Induction BM bx on 11/23/21 in both morphologic and molecular remission (PML- RANJIT copies undetectable). PML-RANJIT FISH by peripheral blood negative after two consolidation cycles.   - Last day of chemotherapy was 6/20/22.   - PB PML-RANJIT RQ-PCR sent 8/30/23, 12/11/2024 negative. FISH sent 3/27/23 was negative.    -3/12/25: PML::RANJIT RT-PCR Results: negative  - Last PML:RANJIT sent in March negative, she will NOT require any further PML:RANJIT to be sent  - Will follow up in 6 months in December for her first survivorship visit.     Ophtho:  - H/O of bilateral subconjunctival  hemorrhage. Wears glasses for reading and driving.   - Followed by Dr. Naveed Morales, follow up scheduled for 8/15     CV:   - Continue with ECHO every 5 years per COG guidelines, next due 5/2028       GYN:   - Followed by TAMMY who reports she has good ovarian reserve function  - Patient to schedule GYN apt to assess galactorrhea   - Appt with Richard Byrd MD on 7/10    ENDO:  - Followed by Dr. Santoyo of endocrine, appt scheduled for 7/22    Ortho:  - Combined hip arthroscopy and periacetabular osteotomy, currently planned for 7/7     RTC: in 6 months (12/17/25) for initial survivorship visit with Dr. George and NP Giancarlo Schroeder

## 2025-06-11 ENCOUNTER — APPOINTMENT (OUTPATIENT)
Dept: PEDIATRIC HEMATOLOGY/ONCOLOGY | Facility: HOSPITAL | Age: 25
End: 2025-06-11
Payer: MEDICARE

## 2025-06-11 ENCOUNTER — CLINICAL SUPPORT (OUTPATIENT)
Dept: PREADMISSION TESTING | Facility: HOSPITAL | Age: 25
End: 2025-06-11
Payer: COMMERCIAL

## 2025-06-11 ENCOUNTER — HOSPITAL ENCOUNTER (OUTPATIENT)
Dept: PEDIATRIC HEMATOLOGY/ONCOLOGY | Facility: HOSPITAL | Age: 25
Discharge: HOME | End: 2025-06-11
Payer: COMMERCIAL

## 2025-06-11 VITALS
TEMPERATURE: 97.5 F | DIASTOLIC BLOOD PRESSURE: 75 MMHG | RESPIRATION RATE: 18 BRPM | BODY MASS INDEX: 28.89 KG/M2 | HEART RATE: 60 BPM | HEIGHT: 67 IN | WEIGHT: 184.08 LBS | SYSTOLIC BLOOD PRESSURE: 117 MMHG

## 2025-06-11 DIAGNOSIS — C92.41 ACUTE PROMYELOCYTIC LEUKEMIA IN REMISSION (MULTI): ICD-10-CM

## 2025-06-11 LAB
BASOPHILS # BLD AUTO: 0.01 X10*3/UL (ref 0–0.1)
BASOPHILS NFR BLD AUTO: 0.3 %
EOSINOPHIL # BLD AUTO: 0.07 X10*3/UL (ref 0–0.7)
EOSINOPHIL NFR BLD AUTO: 2 %
ERYTHROCYTE [DISTWIDTH] IN BLOOD BY AUTOMATED COUNT: 11.9 % (ref 11.5–14.5)
HCT VFR BLD AUTO: 39.3 % (ref 36–46)
HGB BLD-MCNC: 13.5 G/DL (ref 12–16)
IMM GRANULOCYTES # BLD AUTO: 0 X10*3/UL (ref 0–0.7)
IMM GRANULOCYTES NFR BLD AUTO: 0 % (ref 0–0.9)
LYMPHOCYTES # BLD AUTO: 1.02 X10*3/UL (ref 1.2–4.8)
LYMPHOCYTES NFR BLD AUTO: 28.6 %
MCH RBC QN AUTO: 28.9 PG (ref 26–34)
MCHC RBC AUTO-ENTMCNC: 34.4 G/DL (ref 32–36)
MCV RBC AUTO: 84 FL (ref 80–100)
MONOCYTES # BLD AUTO: 0.5 X10*3/UL (ref 0.1–1)
MONOCYTES NFR BLD AUTO: 14 %
NEUTROPHILS # BLD AUTO: 1.97 X10*3/UL (ref 1.2–7.7)
NEUTROPHILS NFR BLD AUTO: 55.1 %
NRBC BLD-RTO: 0 /100 WBCS (ref 0–0)
PLATELET # BLD AUTO: 242 X10*3/UL (ref 150–450)
RBC # BLD AUTO: 4.67 X10*6/UL (ref 4–5.2)
WBC # BLD AUTO: 3.6 X10*3/UL (ref 4.4–11.3)

## 2025-06-11 PROCEDURE — 36415 COLL VENOUS BLD VENIPUNCTURE: CPT | Performed by: NURSE PRACTITIONER

## 2025-06-11 PROCEDURE — 85025 COMPLETE CBC W/AUTO DIFF WBC: CPT | Performed by: NURSE PRACTITIONER

## 2025-06-11 PROCEDURE — 36415 COLL VENOUS BLD VENIPUNCTURE: CPT

## 2025-06-11 ASSESSMENT — COLUMBIA-SUICIDE SEVERITY RATING SCALE - C-SSRS
1. IN THE PAST MONTH, HAVE YOU WISHED YOU WERE DEAD OR WISHED YOU COULD GO TO SLEEP AND NOT WAKE UP?: NO
2. HAVE YOU ACTUALLY HAD ANY THOUGHTS OF KILLING YOURSELF?: NO
6. HAVE YOU EVER DONE ANYTHING, STARTED TO DO ANYTHING, OR PREPARED TO DO ANYTHING TO END YOUR LIFE?: NO

## 2025-06-11 ASSESSMENT — PATIENT HEALTH QUESTIONNAIRE - PHQ9
2. FEELING DOWN, DEPRESSED OR HOPELESS: NOT AT ALL
1. LITTLE INTEREST OR PLEASURE IN DOING THINGS: NOT AT ALL
SUM OF ALL RESPONSES TO PHQ9 QUESTIONS 1 AND 2: 0

## 2025-06-11 ASSESSMENT — ENCOUNTER SYMPTOMS
OCCASIONAL FEELINGS OF UNSTEADINESS: 0
LOSS OF SENSATION IN FEET: 0
DEPRESSION: 0

## 2025-06-11 ASSESSMENT — PAIN SCALES - GENERAL: PAINLEVEL_OUTOF10: 0-NO PAIN

## 2025-06-11 NOTE — PROGRESS NOTES
Massage Therapy / Acupuncture Note:  I visited with Cayetano and Mom today.  Both were in good spirits.  Cayetano is ready to have her hip surgery and to feel better.  I will continue to check in.

## 2025-06-11 NOTE — CPM/PAT H&P
CPM/PAT Evaluation       Name: Cayetano Crooks (Cayetano Crooks)  /Age:  y.o.     { PAT Visit Type:42824}      Chief Complaint: ***    HPI  Cayetano Crooks is scheduled for OSTEOTOMY, PELVIS - Right  RECONSTRUCTION, ACETABULUM (Margaret instruments, margaret screws, total joint saw, stiletto osteotomies, hip scope,guardian) - Right with Dr. Chandler and Dr. Galeas on 25.  Medical History[1]    Surgical History[2]    Patient Sexual activity questions deferred to the physician.    Family History[3]    Allergies[4]    Prior to Admission medications    Medication Sig Start Date End Date Taking? Authorizing Provider   cetirizine (ZyrTEC) 10 mg tablet Take 1 tablet (10 mg) by mouth once daily. 24   Historical Provider, MD   naproxen (EC Naprosyn) 500 mg EC tablet Take 1 tablet (500 mg) by mouth 2 times a day. Do not crush, chew, or split.    Historical Provider, MD   rizatriptan (Maxalt) 5 mg tablet Take 1 tablet (5 mg) by mouth 1 time if needed for migraine. May repeat in 2 hours if unresolved. Do not exceed 30 mg in 24 hours.    Historical Provider, MD   azelastine (Astelin) 137 mcg (0.1 %) nasal spray Administer 1 spray into each nostril if needed. 24  Historical Provider, MD MALCOLM FOWLER Physical Exam     Airway        Visit Vitals  OB Status Having periods   Smoking Status Never       DASI Risk Score      Flowsheet Row Questionnaire Series Submission from 2025 in [facility] OR with Generic Provider Mac   Can you take care of yourself (eat, dress, bathe, or use toilet)?  2.75  filed at 2025 1030   Can you walk indoors, such as around your house? 1.75  filed at 2025 1030   Can you walk a block or two on level ground?  2.75  filed at 2025 1030   Can you climb a flight of stairs or walk up a hill? 5.5  filed at 2025 1030   Can you run a short distance? 8  filed at 2025 1030   Can you do light work around the house like dusting or washing  dishes? 2.7  filed at 05/06/2025 1030   Can you do moderate work around the house like vacuuming, sweeping floors or carrying groceries? 3.5  filed at 05/06/2025 1030   Can you do heavy work around the house like scrubbing floors or lifting and moving heavy furniture?  8  filed at 05/06/2025 1030   Can you do yard work like raking leaves, weeding or pushing a mower? 4.5  filed at 05/06/2025 1030   Can you have sexual relations? 5.25  filed at 05/06/2025 1030   Can you participate in moderate recreational activities like golf, bowling, dancing, doubles tennis or throwing a baseball or football? 6  filed at 05/06/2025 1030   Can you participate in strenous sports like swimming, singles tennis, football, basketball, or skiing? 7.5  filed at 05/06/2025 1030   DASI SCORE 58.2  filed at 05/06/2025 1030   METS Score (Will be calculated only when all the questions are answered) 9.9  filed at 05/06/2025 1030          Caprini DVT Assessment    No data to display       Modified Frailty Index    No data to display       [id number] Stroke Risk Points  Current as of just now        N/A 0 to 9 Points:      Last Change: N/A          The WQF0JV6-WIPv risk score (Lip KAYLEE, et al. 2009. © 2010 American College [employer]) quantifies the risk of stroke for a patient with atrial fibrillation. For patients without atrial fibrillation or under the age of 18 this score appears as N/A. Higher score values generally indicate higher risk of stroke.        This score is not applicable to this patient. Components are not calculated.          Revised Cardiac Risk Index    No data to display       Apfel Simplified Score    No data to display       Risk Analysis Index Results This Encounter    No data found in the last 10 encounters.       Stop Bang Score      Flowsheet Row Questionnaire Series Submission from 5/6/2025 in [facility] OR with Generic Provider Mac   Do you snore loudly? 1  filed at 05/06/2025 1030   Do  you often feel tired or fatigued after your sleep? 0  filed at 05/06/2025 1030   Has anyone ever observed you stop breathing in your sleep? 0  filed at 05/06/2025 1030   Do you have or are you being treated for high blood pressure? 0  filed at 05/06/2025 1030   Recent BMI (Calculated) 28.1  filed at 05/06/2025 1030   Is BMI greater than 35 kg/m2? 0=No  filed at 05/06/2025 1030   Age older than 50 years old? 0=No  filed at 05/06/2025 1030   Gender - Male 0=No  filed at 05/06/2025 1030          Prodigy: High Risk  Total Score: 0          ARISCAT Score for Postoperative Pulmonary Complications    No data to display       Valenzuela Perioperative Risk for Myocardial Infarction or Cardiac Arrest (MARITO)    No data to display       Testing/Diagnostic:   XR PELVIS 1-2 VIEWS; 4/30/2025   IMPRESSION:  No radiographic evidence of an acute fracture.    CT of the pelvis without intravenous contrast dated 4/14/2025.   IMPRESSION:  1. Findings which in the appropriate clinical setting can be  associated with femoroacetabular impingement, bilaterally.  2. Angle measurements given above       XR hip right with pelvis; 4/7/25   IMPRESSION:  1. Cam morphology of the right femur which can be associated with  femoroacetabular impingement.    Echo; 5/10/2023   Summary:  Complete echocardiogram examination with two-dimensional imaging, M-mode, color-Doppler, and spectral Doppler was performed.     1. Left ventricle is normal in size. Normal systolic function.  2. Global LV strain is -20.7 % , normal.  3. Qualitatively normal right ventricular size and normal systolic function.  4. No pericardial effusion     Patient Specialist/PCP:   PCP: Kim Dillon    Orthopaedic Surgery: Isaac Galeas MD 4/30/25 Patient would like to proceed with operative intervention in the form of a combined hip arthroscopy and periacetabular osteotomy.     Orthopaedic Surgery: Paras Chandler MD 4/30/25 came in for an evaluation of her hip and was found to  have impingement of her right hip joint with a labral tear and significant posterior undercoverage. She presents today now with me for an evaluation of her retroversion with her parents     Endocrinology: Felicia Santoyo MD 2/27/25 medical history of APML s/p tx completion 6/2022, here for evaluation of nipple discharge.     HemOnc: Eron Garcia MD 12/11/24 History of APML, treated as per [id number].  Completed therapy on 6/20/22. Here today for 30 month off therapy visit.     Allergy Immunology: Savannah Mercado MD at [facility] 8/26/24 seen for further evaluation with regard to: concern for possible allergies.   Allergic rhinitis: with sensitization to cat, dog, dust mites, mouse, one tree, one grass, and ragweed. Suboptimally controlled.      Chronic rhinitis: suboptimally controlled. Suspect she has a non-allergic rhinitis component and suspect triggered by temperature changes and environmental irritants.     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 Today her ECG shows a normal QT interval and the QT on her last several ECGs has been normal. Her echocardiogram from January 2022 shows normal cardiac function. Cayetano is overall doing very well from a cardiac standpoint.   Plan:  - Follow up: No regular follow up at this time  - SBE Prophylaxis: Not required for dental procedures.  - Activity restrictions: Patient has no activity restrictions from a cardiac standpoint  - Anesthesia: Patient does not require cardiac anesthesia for any sedative procedures.    Assessment and Plan:    ONLY    Reva Huggins RN  Pre-Admission Testing   {Critical access hospital ASSESSMENT AND PLAN:72973}             [1]   Past Medical History:  Diagnosis Date    Acute promyelocytic leukemia in remission (Multi)      Hip dysplasia (HHS-HCC)     Hyperopia     Nipple discharge     Personal history of leukemia     History of leukemia    Personal history of other specified conditions     History of genetic disorder    Retinal hemorrhage of both eyes    [2]   Past Surgical History:  Procedure Laterality Date    OTHER SURGICAL HISTORY      bone marrow    VAGINA SURGERY  2008   [3] No family history on file.  [4]   Allergies  Allergen Reactions    Penicillins Hives

## 2025-06-18 ENCOUNTER — PRE-ADMISSION TESTING (OUTPATIENT)
Dept: PREADMISSION TESTING | Facility: HOSPITAL | Age: 25
End: 2025-06-18
Payer: COMMERCIAL

## 2025-06-18 ENCOUNTER — APPOINTMENT (OUTPATIENT)
Dept: PEDIATRIC HEMATOLOGY/ONCOLOGY | Facility: HOSPITAL | Age: 25
End: 2025-06-18
Payer: COMMERCIAL

## 2025-06-18 ENCOUNTER — HOSPITAL ENCOUNTER (OUTPATIENT)
Dept: CARDIOLOGY | Facility: HOSPITAL | Age: 25
Discharge: HOME | End: 2025-06-18
Payer: COMMERCIAL

## 2025-06-18 VITALS
WEIGHT: 186.7 LBS | TEMPERATURE: 98 F | BODY MASS INDEX: 29.3 KG/M2 | HEIGHT: 67 IN | SYSTOLIC BLOOD PRESSURE: 123 MMHG | HEART RATE: 68 BPM | DIASTOLIC BLOOD PRESSURE: 80 MMHG | OXYGEN SATURATION: 100 %

## 2025-06-18 DIAGNOSIS — S73.191A TEAR OF ACETABULAR LABRUM, RIGHT, INITIAL ENCOUNTER: ICD-10-CM

## 2025-06-18 DIAGNOSIS — I45.81 PROLONGED QT SYNDROME: ICD-10-CM

## 2025-06-18 DIAGNOSIS — R39.11 URINARY HESITANCY: ICD-10-CM

## 2025-06-18 DIAGNOSIS — M25.851 FEMOROACETABULAR IMPINGEMENT OF RIGHT HIP: ICD-10-CM

## 2025-06-18 DIAGNOSIS — Z92.21 HISTORY OF CHEMOTHERAPY: ICD-10-CM

## 2025-06-18 DIAGNOSIS — R30.0 DYSURIA: ICD-10-CM

## 2025-06-18 DIAGNOSIS — Z01.818 PREOPERATIVE EXAMINATION: Primary | ICD-10-CM

## 2025-06-18 DIAGNOSIS — Q65.89 CONGENITAL HIP DYSPLASIA (HHS-HCC): ICD-10-CM

## 2025-06-18 DIAGNOSIS — M25.051 HEMARTHROSIS OF RIGHT HIP: ICD-10-CM

## 2025-06-18 LAB
ABO GROUP (TYPE) IN BLOOD: NORMAL
ANION GAP SERPL CALC-SCNC: 12 MMOL/L (ref 10–20)
ANTIBODY SCREEN: NORMAL
APPEARANCE UR: CLEAR
BILIRUB UR STRIP.AUTO-MCNC: ABNORMAL MG/DL
BUN SERPL-MCNC: 8 MG/DL (ref 6–23)
CALCIUM SERPL-MCNC: 9.4 MG/DL (ref 8.6–10.6)
CHLORIDE SERPL-SCNC: 104 MMOL/L (ref 98–107)
CO2 SERPL-SCNC: 28 MMOL/L (ref 21–32)
COLOR UR: ABNORMAL
CREAT SERPL-MCNC: 0.6 MG/DL (ref 0.5–1.05)
EGFRCR SERPLBLD CKD-EPI 2021: >90 ML/MIN/1.73M*2
ERYTHROCYTE [DISTWIDTH] IN BLOOD BY AUTOMATED COUNT: 12.3 % (ref 11.5–14.5)
GLUCOSE SERPL-MCNC: 85 MG/DL (ref 74–99)
GLUCOSE UR STRIP.AUTO-MCNC: NORMAL MG/DL
HCT VFR BLD AUTO: 41.8 % (ref 36–46)
HGB BLD-MCNC: 13.3 G/DL (ref 12–16)
HOLD SPECIMEN: NORMAL
KETONES UR STRIP.AUTO-MCNC: NEGATIVE MG/DL
LEUKOCYTE ESTERASE UR QL STRIP.AUTO: ABNORMAL
MCH RBC QN AUTO: 28.5 PG (ref 26–34)
MCHC RBC AUTO-ENTMCNC: 31.8 G/DL (ref 32–36)
MCV RBC AUTO: 90 FL (ref 80–100)
NITRITE UR QL STRIP.AUTO: ABNORMAL
NRBC BLD-RTO: 0 /100 WBCS (ref 0–0)
PH UR STRIP.AUTO: 7 [PH]
PLATELET # BLD AUTO: 264 X10*3/UL (ref 150–450)
POTASSIUM SERPL-SCNC: 4.3 MMOL/L (ref 3.5–5.3)
PROT UR STRIP.AUTO-MCNC: NEGATIVE MG/DL
RBC # BLD AUTO: 4.66 X10*6/UL (ref 4–5.2)
RBC # UR STRIP.AUTO: NEGATIVE MG/DL
RBC #/AREA URNS AUTO: ABNORMAL /HPF
RH FACTOR (ANTIGEN D): NORMAL
SODIUM SERPL-SCNC: 140 MMOL/L (ref 136–145)
SP GR UR STRIP.AUTO: 1.01
SQUAMOUS #/AREA URNS AUTO: ABNORMAL /HPF
UROBILINOGEN UR STRIP.AUTO-MCNC: ABNORMAL MG/DL
WBC # BLD AUTO: 6.4 X10*3/UL (ref 4.4–11.3)
WBC #/AREA URNS AUTO: ABNORMAL /HPF

## 2025-06-18 PROCEDURE — 86850 RBC ANTIBODY SCREEN: CPT

## 2025-06-18 PROCEDURE — 87081 CULTURE SCREEN ONLY: CPT

## 2025-06-18 PROCEDURE — 99204 OFFICE O/P NEW MOD 45 MIN: CPT

## 2025-06-18 PROCEDURE — 87086 URINE CULTURE/COLONY COUNT: CPT

## 2025-06-18 PROCEDURE — 86900 BLOOD TYPING SEROLOGIC ABO: CPT

## 2025-06-18 PROCEDURE — 36415 COLL VENOUS BLD VENIPUNCTURE: CPT

## 2025-06-18 PROCEDURE — 81001 URINALYSIS AUTO W/SCOPE: CPT

## 2025-06-18 PROCEDURE — 85027 COMPLETE CBC AUTOMATED: CPT

## 2025-06-18 PROCEDURE — 80048 BASIC METABOLIC PNL TOTAL CA: CPT

## 2025-06-18 RX ORDER — CHLORHEXIDINE GLUCONATE 40 MG/ML
SOLUTION TOPICAL DAILY PRN
Qty: 473 ML | Refills: 0 | Status: SHIPPED | OUTPATIENT
Start: 2025-06-18

## 2025-06-18 RX ORDER — CHLORHEXIDINE GLUCONATE ORAL RINSE 1.2 MG/ML
15 SOLUTION DENTAL AS NEEDED
Qty: 120 ML | Refills: 0 | Status: SHIPPED | OUTPATIENT
Start: 2025-06-18 | End: 2025-06-20

## 2025-06-18 ASSESSMENT — DUKE ACTIVITY SCORE INDEX (DASI)
CAN YOU PARTICIPATE IN STRENOUS SPORTS LIKE SWIMMING, SINGLES TENNIS, FOOTBALL, BASKETBALL, OR SKIING: YES
DASI METS SCORE: 9.9
CAN YOU DO YARD WORK LIKE RAKING LEAVES, WEEDING OR PUSHING A MOWER: YES
TOTAL_SCORE: 58.2
CAN YOU HAVE SEXUAL RELATIONS: YES
CAN YOU TAKE CARE OF YOURSELF (EAT, DRESS, BATHE, OR USE TOILET): YES
CAN YOU WALK INDOORS, SUCH AS AROUND YOUR HOUSE: YES
CAN YOU PARTICIPATE IN MODERATE RECREATIONAL ACTIVITIES LIKE GOLF, BOWLING, DANCING, DOUBLES TENNIS OR THROWING A BASEBALL OR FOOTBALL: YES
CAN YOU DO HEAVY WORK AROUND THE HOUSE LIKE SCRUBBING FLOORS OR LIFTING AND MOVING HEAVY FURNITURE: YES
CAN YOU WALK A BLOCK OR TWO ON LEVEL GROUND: YES
CAN YOU CLIMB A FLIGHT OF STAIRS OR WALK UP A HILL: YES
CAN YOU RUN A SHORT DISTANCE: YES
CAN YOU DO LIGHT WORK AROUND THE HOUSE LIKE DUSTING OR WASHING DISHES: YES
CAN YOU DO MODERATE WORK AROUND THE HOUSE LIKE VACUUMING, SWEEPING FLOORS OR CARRYING GROCERIES: YES

## 2025-06-18 ASSESSMENT — ENCOUNTER SYMPTOMS
COUGH: 0
VOMITING: 0
VOICE CHANGE: 0
LIGHT-HEADEDNESS: 0
NECK SWELLING: 0
EYE DISCHARGE: 0
ABDOMINAL PAIN: 0
BRUISES/BLEEDS EASILY: 0
WOUND: 0
POLYDIPSIA: 0
PALPITATIONS: 0
JOINT SWELLING: 0
NUMBNESS: 0
WHEEZING: 0
VERTIGO: 0
WEAKNESS: 0
CONSTIPATION: 0
SINUS CONGESTION: 1
RHINORRHEA: 0
NECK PAIN: 0
DYSPNEA AT REST: 0
CHILLS: 0
SKIN CHANGES: 0
ARTHRALGIAS: 0
TROUBLE SWALLOWING: 0
NECK STIFFNESS: 0
NECK MASS: 0
EXCESSIVE BLEEDING: 0
UNEXPECTED WEIGHT CHANGE: 0
HEMOPTYSIS: 0
VISUAL CHANGE: 0
CONFUSION: 0
PND: 0
DIFFICULTY URINATING: 0
TREMORS: 0
BLOOD IN STOOL: 0
FEVER: 0
DOUBLE VISION: 0
NAUSEA: 0
LIMITED RANGE OF MOTION: 0
NERVOUS/ANXIOUS: 0
DYSURIA: 0
SHORTNESS OF BREATH: 0
ABDOMINAL DISTENTION: 0
DIARRHEA: 0
DYSPNEA WITH EXERTION: 0
MYALGIAS: 0

## 2025-06-18 ASSESSMENT — LIFESTYLE VARIABLES: SMOKING_STATUS: NONSMOKER

## 2025-06-18 ASSESSMENT — PAIN SCALES - GENERAL: PAINLEVEL_OUTOF10: 4

## 2025-06-18 ASSESSMENT — PAIN - FUNCTIONAL ASSESSMENT: PAIN_FUNCTIONAL_ASSESSMENT: 0-10

## 2025-06-18 NOTE — PREPROCEDURE INSTRUCTIONS
Thank you for visiting [facility] ([facility]) today for your pre-procedure evaluation, you were seen by     Camryn Cevallos PA-C   Department of Anesthesiology and Perioperative Medicine  Main phone [phone]  Fax [phone]    This summary includes instructions and information to aid you during your perioperative period.  Please read carefully. If you have any questions about your visit today, please call the number listed above.  If you become ill or have any changes to your health before your surgery, please contact your primary care provider and alert your surgeon.    General Medications Instructions (see back for further medication instructions)  Hold Vit D supplementation day of surgery  Hold all other vitamins and supplements 7 days prior to surgery  Tylenol okay to continue, please hold Aleve/naproxen/ibuprofen (NSAIDs) for 7 days prior to surgery  No lotion/moisturizers or Deoderant after last shower prior to surgery    You will be called business day prior to surgery to confirm arrival time.     Preparing for Surgery       Preoperative Fasting Guidelines  Why must I stop eating and drinking near surgery time?  With sedation, food or liquid in your stomach can enter your lungs causing serious complications  Food can increase nausea and vomiting  When do I need to stop eating and drinking before my surgery?  Do not eat any food after midnight the night before your surgery/procedure.  You may have up to 13.5 ounces of clear liquid until TWO hours before your instructed arrival time to the hospital.  This includes water, black tea/coffee, (no milk or cream) apple juice, and electrolyte drinks (Gatorade)  You may chew gum until TWO hours before your surgery/procedure    Tobacco and Alcohol;  Do not drink alcohol or smoke within 24 hours of surgery.  It is best to quit smoking for as long as possible before any surgery or procedure.     Other Instructions  Why did I have my nose,  "under my arms, and groin swabbed? The purpose of the swab is to identify Staphylococcus aureus inside your nose or on your skin.  The swab was sent to the laboratory for culture.  A positive swab/culture for Staphylococcus aureus is called colonization or carriage.   What is Staphylococcus aureus? Staphylococcus aureus, also known as \"staph\", is a germ found on the skin or in the nose of healthy people.  Sometimes Staphylococcus aureus can get into the body and cause an infection.  This can be minor (such as pimples, boils, or other skin problems).  It might also be serious (such as a blood infection, pneumonia, or a surgical site infection). What is Staphylococcus aureus colonization or carriage? Colonization or carriage means that a person has the germ but is not sick from it.  These bacteria can be spread on the hands or when breathing or sneezing. How is Staphylococcus aureus spread? It is most often spread by close contact with a person or item that carries it. What happens if my culture is positive for Staphylococcus aureus? Your doctor/medical team will use this information to guide any antibiotic treatment which may be necessary.  Regardless of the culture results, we will clean the inside of your nose with a betadine swab just before you have your surgery. Will I get an infection if I have Staphylococcus aureus in my nose or on my skin? Anyone can get an infection with Staphylococcus aureus.  However, the best way to reduce your risk of infection is to follow the instructions provided to you for the use of your CHG soap and dental rinse.  , Body Wash; What is a home preoperative antibacterial shower? This shower is a way of cleaning the skin with a germ-killing solution before surgery.  The solution contains chlorhexidine, commonly known as CHG.  CHG is a skin cleanser with germ-killing ability.  Let your doctor know if you are allergic to chlorhexidine. Why do I need to take a preoperative antibacterial " shower? Skin is not sterile.  It is best to try to make your skin as free of germs as possible before surgery.  Proper cleansing with a germ-killing soap before surgery can lower the number of germs on your skin.  This helps to reduce the risk of infection at the surgical site.  Following the instructions listed below will help you prepare your skin for surgery.   How do I use the solution? Steps:  Begin using your CHG soap 5 days before your scheduled surgery on ___________.   First, wash and rinse your hair using the CHG soap. Keep CHG soap away from ear canals and eyes.  Rinse completely, do not condition.  Hair extensions should be removed. , Oral/Dental Rinse: What is oral/dental rinse?  It is a mouthwash. It is a way of cleaning the mouth with a germ-killing solution before your surgery.  The solution contains chlorhexidine, commonly known as CHG. It is used inside the mouth to kill a bacteria known as Staphylococcus aureus.  Let your doctor know if you are allergic to Chlorhexidine. Why do I need to use CHG oral/dental rinse? The CHG oral/dental rinse helps to kill a bacteria in your mouth known as Staphylococcus aureus.  This reduces the risk of infection at the surgical site.  Using your CHG oral/dental rinse STEPS: Use your CHG oral/dental rinse after you brush your teeth the night before (at bedtime) and the morning of your surgery.  Follow all directions on your prescription label.  Use the cap on the container to measure 15 ml.  Swish (gargle if you can) the mouthwash in your mouth for at least 30 seconds, (do not swallow) and spit out.  After you use your CHG rinse, do not rinse your mouth with water, drink or eat.  Please refer to the prescription label for the appropriate time to resume oral intake What side effects might I have using the CHG oral/dental rinse? CHG rinse will stick to plaque on the teeth.  Brush and floss just before use.  Teeth brushing will help avoid staining of plaque during use.        The Week before Surgery        Seven days before Surgery  Check your [facility] medication instructions  Do the exercises provided to you by [facility]   Arrange for a responsible, adult licensed  to take you home after surgery and stay with you for 24 hours.  You will not be permitted to drive yourself home if you have received any anesthetic/sedation  Five days before surgery  Check your [facility] medication instructions  Do the exercises provided to you by [facility]   Start using Chlorhexidene (CHG) body wash if prescribed (Continue till day of surgery)      The Day before Surgery       Check your [facility] medication and all other [facility] instructions including when to stop eating and drinking  You will be called with your arrival time for surgery in the late afternoon.  If you do not receive a call please reach out to your surgeon's office.  Do not smoke or drink 24 hours before surgery  Prepare items to bring with you to the hospital  Shower with your chlorhexidine wash if prescribed  Brush your teeth and use your chlorhexidine dental rinse if prescribed    The Day of Surgery       Check your [facility] medication instructions  Shower, if prescribed use CHG.  Do not apply any lotions, creams, moisturizers, perfume or deodorant  Brush your teeth and use your CHG dental rinse if prescribed  Wear loose comfortable clothing  Avoid make-up  Remove  jewelry and piercings, consider professional piercing removal with a plastic spacer if needed  Bring photo ID and Insurance card  Bring an accurate medication list that includes medication dose, frequency and allergies  Bring a copy of your advanced directives (will, health care power of )  Bring any devices and controllers as well as medical devices you have been provided with for surgery (CPAP, slings, braces, etc.)  Dentures, eyeglasses, and contacts will be removed before surgery, please bring cases for contacts or glasses    Preoperative Deep Breathing Exercises    Why it is important to  do deep breathing exercises before my surgery?  Deep breathing exercises strengthen your breathing muscles.  This helps you to recover after your surgery and decreases the chance of breathing complications.    How are the deep breathing exercises done?  Sit straight with your back supported.  Breathe in deeply and slowly through your nose. Your lower rib cage should expand and your abdomen may move forward.  Hold that breath for 3 to 5 seconds.  Breathe out through pursed lips, slowly and completely.  Rest and repeat 10 times every hour while awake.  Rest longer if you become dizzy or lightheaded.      Preoperative Brain Exercises    What are brain exercises?  A brain exercise is any activity that engages your thinking (cognitive) skills.    What types of activities are considered brain exercises?  Jigsaw puzzles, crossword puzzles, word jumble, memory games, word search, and many more.  Many can be found free online or on your phone via a mobile tee.    Why should I do brain exercises before my surgery?  More recent research has shown brain exercise before surgery can lower the risk of postoperative delirium (confusion) which can be especially important for older adults.  Patients who did brain exercises for 5 to 10 hours the days before surgery, cut their risk of postoperative delirium in half up to 1 week after surgery.    Sit-to-Stand Exercise    What is the sit-to-stand exercise?  The sit-to-stand exercise strengthens the muscles of your lower body and muscles in the center of your body (core muscles for stability) helping to maintain and improve your strength and mobility.  How do I do the sit-to-stand exercise?  The goal is to do this exercise without using your arms or hands.  If this is too difficult, use your arms and hands or a chair with armrests to help slowly push yourself to the standing position and lower yourself back to the sitting position. As the movement becomes easier use your arms and hands  less.    Steps to the sit-to-stand exercise  Sit up tall in a sturdy chair, knees bent, feet flat on the floor shoulder-width apart.  Shift your hips/pelvis forward in the chair to correctly position yourself for the next movement.  Lean forward at your hips.  Stand up straight putting equal weight on both feet.  Check to be sure you are properly aligned with the chair, in a slow controlled movement sit back down.  Repeat this exercise 10-15 times.  If needed you can do it fewer times until your strength improves.  Rest for 1 minute.  Do another 10-15 sit-to-stand exercises.  Try to do this in the morning and evening.       Simple things you can do to help prevent blood clots     Blood clots are blockages that can form in the body's veins. When a blood clot forms in your deep veins, it may be called a deep vein thrombosis, or DVT for short. Blood clots can happen in any part of the body where blood flows, but they are most common in the arms and legs. If a piece of a blood clot breaks free and travels to the lungs, it is called a pulmonary embolus (PE). A PE can be a very serious problem.      Being in the hospital or having surgery can raise your chances of getting a blood clot because you may not be well enough to move around as much as you normally do.         Ways you can help prevent blood clots in the hospital       Wearing SCDs  SCDs stands for Sequential Compression Devices.   SCDs are special sleeves that wrap around your legs. They attach to a pump that fills them with air to gently squeeze your legs every few minutes.  This helps return the blood in your legs to your heart.   SCDs should only be taken off when walking or bathing. SCDs may not be comfortable, but they can help save your life.              Pump SCD leg sleeves  Wearing compression stockings - if your doctor orders them. These special snug-fitting stockings gently squeeze your legs to help blood flow.       Walking. Walking helps move the  blood in your legs.   If your doctor says it is ok, try walking the halls at least   5 times a day. Ask us to help you get up, so you don't fall.      Taking any blood-thinning medicines your doctor orders.              Ways you can help prevent blood clots at home         Wearing compression stockings - if your doctor orders them.   Walking - to help move the blood in your legs.    Taking any blood-thinning medicines your doctor orders.      Signs of a blood clot or PE    Tell your doctor or nurse right away if you have any of the problems listed below.         If you are at home, seek medical care right away. Call 911 for chest pain or problems breathing.            Signs of a blood clot (DVT) - such as pain, swelling, redness, or warmth in your arm or legs.  Signs of a pulmonary embolism (PE) - such as chest pain or feeling short of breath

## 2025-06-18 NOTE — CPM/PAT H&P
CPM/PAT Evaluation       Name: Cayetano Crooks (Cayetano Crooks)  /Age: 2000/24 y.o.     Visit Type:   In-Person       Chief Complaint: perioperative evaluation    HPI HPI: 25 y/o female scheduled for OSTEOTOMY, PELVIS - Right, RECONSTRUCTION, ACETABULUM- Right  on 2025  secondary to Congenital hip dysplasia, Tear of acetabular labrum, right, initial encounter, Femoroacetabular impingement of right hip , Hemarthrosis of right hip  with Dr. Paras Chandler & Isaac Galeas  who referred to CPM.  Presents to [facility] today for perioperative risk stratification and optimization. PMHX includes Anxiety, migraines, Insomnia, Retinal heme, leukemic retinopathy, HLD, h/o prolong Qtc , Acute Promyelocytic Leukemia, Congenital hip dysplasia, Tear of acetabular labrum, right, Femoroacetabular impingement of right hip , Hemarthrosis of right hip, chemotherapy induced neuropathy.      Specialists:   Ortho surgery- Isaac Galeas MD  & Paras Chandler MD   Heme/Onc- Pippa Falcon, APRN-CNP   Endocrinology- Felicia Santoyo MD   Allergy Immunology- Savannah Mercado MD   Peds Cardiology- Williams Ge MD 22  Ophthalmology- Naveed Morales MD             Medical History[1]    Surgical History[2]    Patient Sexual activity questions deferred to the physician.    Family History[3]    Allergies[4]    Prior to Admission medications    Medication Sig Start Date End Date Taking? Authorizing Provider   cetirizine (ZyrTEC) 10 mg tablet Take 1 tablet (10 mg) by mouth once daily. 24   Historical Provider, MD   naproxen (EC Naprosyn) 500 mg EC tablet Take 1 tablet (500 mg) by mouth 2 times a day. Do not crush, chew, or split.    Historical Provider, MD   rizatriptan (Maxalt) 5 mg tablet Take 1 tablet (5 mg) by mouth 1 time if needed for migraine. May repeat in 2 hours if unresolved. Do not exceed 30 mg in 24 hours.    Historical Provider, MD FOWLER ROS:   Constitutional:    no fever   no chills   no unexpected weight  change  Neuro/Psych:    no numbness   no weakness   no light-headedness   no tremors   no confusion   not nervous/anxious   no self-injury   no suicidal ideation  Eyes:    no discharge   no vision loss   no diplopia   no visual disturbance   use of corrective lenses  Ears:    no ear pain   no hearing loss   no vertigo   no tinnitus   no hearing aides  Nose:    no nasal discharge   sinus congestion   no epistaxis  Mouth:    no dental issues   no mouth pain   no oral bleeding   no mouth lesions  Throat:    no throat pain   no dysphagia   no voice change  Neck:    no neck pain   neck swelling   no neck stiffness   no neck masses  Cardio:    no chest pain   no palpitations   no peripheral edema   no dyspnea   no CISSE   no paroxysmal nocturnal dyspnea  Respiratory:    no cough   no wheezing   no hemoptysis   no shortness of breath  Endocrine:    no cold intolerance   no heat intolerance   no polydipsia  GI:    no abdominal distention   no abdominal pain   no constipation   no diarrhea   no nausea   no vomiting   no blood in stool  :    no difficulty urinating   no dysuria   no oliguria   no polyuria  Musculoskeletal:    no arthralgias   no myalgias   no decreased ROM   no swelling  Hematologic:    does not bruise/bleed easily   no excessive bleeding   no history of blood transfusion   no blood clots  Skin:   no skin changes   no sores/wound   no rash      Physical Exam  Vitals reviewed. Physical exam within normal limits.   Constitutional:       General: She is not in acute distress.     Appearance: Normal appearance. She is normal weight. She is not ill-appearing, toxic-appearing or diaphoretic.   HENT:      Head: Normocephalic.      Nose: Nose normal. No congestion or rhinorrhea.      Mouth/Throat:      Mouth: Mucous membranes are moist.      Pharynx: Oropharynx is clear. No oropharyngeal exudate or posterior oropharyngeal erythema.   Eyes:      General: No scleral icterus.        Right eye: No discharge.         Left  "eye: No discharge.      Conjunctiva/sclera: Conjunctivae normal.   Neck:      Vascular: No carotid bruit.   Cardiovascular:      Rate and Rhythm: Normal rate and regular rhythm.      Pulses: Normal pulses.      Heart sounds: Normal heart sounds. No murmur heard.     No friction rub. No gallop.   Pulmonary:      Effort: Pulmonary effort is normal. No respiratory distress.      Breath sounds: Normal breath sounds. No stridor. No wheezing, rhonchi or rales.   Chest:      Chest wall: No tenderness.   Musculoskeletal:         General: No swelling or tenderness. Normal range of motion.      Cervical back: Normal range of motion and neck supple. No rigidity or tenderness.   Skin:     General: Skin is warm and dry.   Neurological:      General: No focal deficit present.      Mental Status: She is alert.      Motor: No weakness.   Psychiatric:         Mood and Affect: Mood normal.         Behavior: Behavior normal.         Thought Content: Thought content normal.         Judgment: Judgment normal.          PAT AIRWAY:   Airway:     Mallampati::  I    TM distance::  >3 FB    Neck ROM::  Full   Metal retained on bottom      Visit Vitals  /80   Pulse 68   Temp 36.7 °C (98 °F) (Temporal)   Ht 1.702 m (5' 7\")   Wt 84.7 kg (186 lb 11.2 oz)   SpO2 100%   BMI 29.24 kg/m²   OB Status Having periods   Smoking Status Never   BSA 2 m²       DASI Risk Score      Flowsheet Row Pre-Admission Testing from 6/18/2025 in [facility] Questionnaire Series Submission from 5/6/2025 in [facility] OR with Generic Provider Mychart   Can you take care of yourself (eat, dress, bathe, or use toilet)?  2.75 filed at 06/18/2025 1018 2.75  filed at 05/06/2025 1030   Can you walk indoors, such as around your house? 1.75 filed at 06/18/2025 1018 1.75  filed at 05/06/2025 1030   Can you walk a block or two on level ground?  2.75 filed at 06/18/2025 1018 2.75  filed at 05/06/2025 1030   Can you climb a flight of stairs or " walk up a hill? 5.5 filed at 06/18/2025 1018 5.5  filed at 05/06/2025 1030   Can you run a short distance? 8 filed at 06/18/2025 1018 8  filed at 05/06/2025 1030   Can you do light work around the house like dusting or washing dishes? 2.7 filed at 06/18/2025 1018 2.7  filed at 05/06/2025 1030   Can you do moderate work around the house like vacuuming, sweeping floors or carrying groceries? 3.5 filed at 06/18/2025 1018 3.5  filed at 05/06/2025 1030   Can you do heavy work around the house like scrubbing floors or lifting and moving heavy furniture?  8 filed at 06/18/2025 1018 8  filed at 05/06/2025 1030   Can you do yard work like raking leaves, weeding or pushing a mower? 4.5 filed at 06/18/2025 1018 4.5  filed at 05/06/2025 1030   Can you have sexual relations? 5.25 filed at 06/18/2025 1018 5.25  filed at 05/06/2025 1030   Can you participate in moderate recreational activities like golf, bowling, dancing, doubles tennis or throwing a baseball or football? 6 filed at 06/18/2025 1018 6  filed at 05/06/2025 1030   Can you participate in strenous sports like swimming, singles tennis, football, basketball, or skiing? 7.5 filed at 06/18/2025 1018 7.5  filed at 05/06/2025 1030   DASI SCORE 58.2 filed at 06/18/2025 1018 58.2  filed at 05/06/2025 1030   METS Score (Will be calculated only when all the questions are answered) 9.9 filed at 06/18/2025 1018 9.9  filed at 05/06/2025 1030          Capjonathani DVT Assessment      Flowsheet Row Pre-Admission Testing from 6/18/2025 in [facility]   DVT Score (IF A SCORE IS NOT CALCULATING, MUST SELECT A BMI TO COMPLETE) 6 filed at 06/18/2025 1047   Medical Factors Present cancer, chemotherapy, or previous malignancy filed at 06/18/2025 1047   Surgical Factors Major surgery planned, lasting 2-3 hours filed at 06/18/2025 1047   BMI (BMI MUST BE CHOSEN) 30 or less filed at 06/18/2025 1047          Modified Frailty Index    No data to display       [id number] Stroke Risk  Points  Current as of just now        N/A 0 to 9 Points:      Last Change: N/A          The [id number] risk score (Lip KAYLEE, et al. 2009. © 2010 [employer]) quantifies the risk of stroke for a patient with atrial fibrillation. For patients without atrial fibrillation or under the age of 18 this score appears as N/A. Higher score values generally indicate higher risk of stroke.        This score is not applicable to this patient. Components are not calculated.          Revised Cardiac Risk Index      Flowsheet Row Pre-Admission Testing from 6/18/2025 in [facility]   High-Risk Surgery (Intraperitoneal, Intrathoracic,Suprainguinal vascular) 0 filed at 06/18/2025 1047   History of ischemic heart disease (History of MI, History of positive exercuse test, Current chest paint considered due to myocardial ischemia, Use of nitrate therapy, ECG with pathological Q Waves) 0 filed at 06/18/2025 1047   History of congestive heart failure (pulmonary edemia, bilateral rales or S3 gallop, Paroxysmal nocturnal dyspnea, CXR showing pulmonary vascular redistribution) 0 filed at 06/18/2025 1047   History of cerebrovascular disease (Prior TIA or stroke) 0 filed at 06/18/2025 1047   Pre-operative insulin treatment 0 filed at 06/18/2025 1047   Pre-operative creatinine>2 mg/dl 0 filed at 06/18/2025 1047   Revised Cardiac Risk Calculator 0 filed at 06/18/2025 1047          Apfel Simplified Score      Flowsheet Row Pre-Admission Testing from 6/18/2025 in [facility]   Smoking status 1 filed at 06/18/2025 1048   History of motion sickness or PONV  0 filed at 06/18/2025 1048   Use of postoperative opioids 1 filed at 06/18/2025 1048   Gender - Female 1=Yes filed at 06/18/2025 1048   Apfel Simplified Score Calculator 3 filed at 06/18/2025 1048          Risk Analysis Index Results This Encounter    No data found in the last 10 encounters.       Stop Bang Score      Flowsheet Row  Pre-Admission Testing from 6/18/2025 in [facility] Questionnaire Series Submission from 5/6/2025 in [facility] OR with Generic Provider Mac   Do you snore loudly? 1 filed at 06/18/2025 1019 1  filed at 05/06/2025 1030   Do you often feel tired or fatigued after your sleep? 0 filed at 06/18/2025 1019 0  filed at 05/06/2025 1030   Has anyone ever observed you stop breathing in your sleep? 1  [sleep study x 6 months ago inclusive, return  x 1 year] filed at 06/18/2025 1019 0  filed at 05/06/2025 1030   Do you have or are you being treated for high blood pressure? 0 filed at 06/18/2025 1019 0  filed at 05/06/2025 1030   Recent BMI (Calculated) 28.6 filed at 06/18/2025 1019 28.1  filed at 05/06/2025 1030   Is BMI greater than 35 kg/m2? 0=No filed at 06/18/2025 1019 0=No  filed at 05/06/2025 1030   Age older than 50 years old? 0=No filed at 06/18/2025 1019 0=No  filed at 05/06/2025 1030   Is your neck circumference greater than 17 inches (Male) or 16 inches (Female)? 0 filed at 06/18/2025 1019 --   Gender - Male 0=No filed at 06/18/2025 1019 0=No  filed at 05/06/2025 1030   STOP-BANG Total Score 2 filed at 06/18/2025 1019 --          Prodigy: High Risk  Total Score: 0          ARISCAT Score for Postoperative Pulmonary Complications      Flowsheet Row Pre-Admission Testing from 6/18/2025 in [facility]   Age Calculated Score 0 filed at 06/18/2025 1047   Preoperative SpO2 0 filed at 06/18/2025 1047   Respiratory infection in the last month Either upper or lower (i.e., URI, bronchitis, pneumonia), with fever and antibiotic treatment 0 filed at 06/18/2025 1047   Preoperative anemia (Hgb less than 10 g/dl) 0 filed at 06/18/2025 1047   Surgical incision  0 filed at 06/18/2025 1047   Duration of surgery  16 filed at 06/18/2025 1047   Emergency Procedure  0 filed at 06/18/2025 1047   ARISCAT Total Score  16 filed at 06/18/2025 1047          Nicolas Perioperative Risk for  Myocardial Infarction or Cardiac Arrest (MARITO)    No data to display       Assessment and Plan:   Anesthesia/Airway:  No anesthesia complications        Neuro:    #Anxiety, migraines, insomnia -patient is medicated with rizatriptan as needed (hold day of surgery), and follows with primary care.  She states that she uses medicinal marijuana for insomnia at night.  Patient was notified of perioperative risk associate with marijuana.  No further preoperative intervention.    Patient is at an increased risk for post operative delirium secondary to type and duration of surgery.  Preoperative brain exercise educational handout provided to patient.    The patient is at an increased risk for perioperative stroke secondary to HLD, female sex , general anesthesia, hypercoagulable state, and op time >2.5 hours.       HEENT/Airway:    #Retinal heme, leukemic retinopathy- follows with optho. No further preoperative testing/intervention indicated at this time.      Cardiovascular:    #HLD, prolonged QT syndrome- Follows with PCP.  Patient was seen in 2022 regarding QT syndrome as well as surveillance with echocardiogram and following chemotherapy treatment.  During this appointment I was told that she does not have to follow-up aside from every 5-year echocardiogram for monitoring.  Patient's heme oncologist has been following with this echocardiogram per guidelines, next  to occur in 2028.  BP today is 123/80 and denies cardiovascular symptoms. Physical exam is benign. METS is >4 and scheduled for non-cardiac surgery.  Dr Williams Ge MD seen in past on 9/12/22 notes no cardiac anesthesia need, no restrictions/need for follow up. No additional preoperative testing is currently indicated.  Patient's EKG was updated today.    METS are 9.9    RCRI  0 which is 3.9% 30 day risk of MACE (risk for cardiac death, nonfatal myocardial infarction, and nonfactal cardiac arrest    MARITO score which indicates a   0.1 % risk of  intraoperative or 30-day postoperative MACE    EKG 25  Sinus bradycardia   Pending cardiology review    EKG 22   Normal sinus rhythm with sinus arrhythmia  Normal ECG  When compared with ECG of 2022 09:02,  No significant change was found  Confirmed by Williams Ge ([id number]) on 9/15/2022 2:38:42 PM    Echo 5/10/23  Summary:  1. Left ventricle is normal in size. Normal systolic function.  2. Global LV strain is -20.7 % , normal.  3. Qualitatively normal right ventricular size and normal systolic function.  4. No pericardial effusion.        Pulmonary:    No Pulmonary diagnosis or significant findings on chart review or clinical presentation and evaluation. Preoperative deep breathing educational handout provided to patient.    No pulmonary diagnosis, however patient is at increased risk of perioperative complications secondary to  duration of surgery > 2 hours, types of anesthetic    ARISCAT:    16  points which is a low (1.6%) risk of in-hospital post-op pulmonary complications     PRODIGY:  0  points which is a low risk of post op opioid induced respiratory depression episodes    STOP BAN   points which is a low risk for moderate to severe LUCY    Pumonary toilet education discussed, patient also provided deep breathing exercises and incentive spirometry educational handout      Renal:   No renal diagnosis, however patient is at increase risk for perioperative renal complications secondary to  use of an ace, arb, or NSAID       Endocrine:   No endocrine diagnosis or significant findings on chart review or clinical presentation and evaluation. No further testing or intervention is indicated at this time.      Hematologic/Oncology:      #Acute Promyelocytic Leukemia, s/p chemotherapy completed therapy on 22 - Patient continues to follow with heme/onc. They are to have echo completed every 5 years due to history of anthracycline. Patent not due again until .  Patient was still considered  stable during last heme-onc appointment this year.    Patient confirmed they would accept blood products if necessary.   Preoperative DVT educational handout provided to patient.  Caprini Score:  6  points which is a highest risk of perioperative VTE      Gastrointestinal:   # Dysuria, urgency-patient reports that she began to have urinary symptoms this morning and immediately went to local drugstore to purchase Azo.  Patient reports that she endorses a burning sensation when urinating and have constant sensation to urinate however is unable to.  Urinalysis completed today, pending results.  #Addendum - urine positive for leukocytes and nitrites, pending culture. Primary care office called notified and faxed results. Patient notified.     EAT-10 score of    0  : self-perceived oropharyngeal dysphagia scale (0-40)     Apfel: 3 points 61% risk for post operative N/V      Infectious disease:     No infectious diagnosis or significant findings on chart review or clinical presentation and evaluation.   Prescription provided for CHG body wash and dental rinse. CHG use instructions reviewed and provided to patient.  Staph screen collected      Musculoskeletal:    #Congenital hip dysplasia, Tear of acetabular labrum, right, Femoroacetabular impingement of right hip , Hemarthrosis of right hip, chemotherapy induced neuropathy, -patient is currently seeking surgical intervention and reports chronic dull hip pain.  Patient notified to hold naproxen 7 days prior to surgery.      Other:     Hold Vit D supplementation day of surgery  Hold all other vitamins and supplements 7 days prior to surgery  Tylenol okay to continue, please hold Aleve/naproxen/ibuprofen/Excedrin (NSAIDs) for 7 days prior to surgery  No lotion/moisturizers or Deoderant after last shower prior to surgery        Labs ordered  Recent Results (from the past 2 weeks)   CBC and Auto Differential    Collection Time: 06/11/25  9:32 AM   Result Value Ref Range    WBC  3.6 (L) 4.4 - 11.3 x10*3/uL    nRBC 0.0 0.0 - 0.0 /100 WBCs    RBC 4.67 4.00 - 5.20 x10*6/uL    Hemoglobin 13.5 12.0 - 16.0 g/dL    Hematocrit 39.3 36.0 - 46.0 %    MCV 84 80 - 100 fL    MCH 28.9 26.0 - 34.0 pg    MCHC 34.4 32.0 - 36.0 g/dL    RDW 11.9 11.5 - 14.5 %    Platelets 242 150 - 450 x10*3/uL    Neutrophils % 55.1 40.0 - 80.0 %    Immature Granulocytes %, Automated 0.0 0.0 - 0.9 %    Lymphocytes % 28.6 13.0 - 44.0 %    Monocytes % 14.0 2.0 - 10.0 %    Eosinophils % 2.0 0.0 - 6.0 %    Basophils % 0.3 0.0 - 2.0 %    Neutrophils Absolute 1.97 1.20 - 7.70 x10*3/uL    Immature Granulocytes Absolute, Automated 0.00 0.00 - 0.70 x10*3/uL    Lymphocytes Absolute 1.02 (L) 1.20 - 4.80 x10*3/uL    Monocytes Absolute 0.50 0.10 - 1.00 x10*3/uL    Eosinophils Absolute 0.07 0.00 - 0.70 x10*3/uL    Basophils Absolute 0.01 0.00 - 0.10 x10*3/uL   CBC    Collection Time: 06/18/25 10:38 AM   Result Value Ref Range    WBC 6.4 4.4 - 11.3 x10*3/uL    nRBC 0.0 0.0 - 0.0 /100 WBCs    RBC 4.66 4.00 - 5.20 x10*6/uL    Hemoglobin 13.3 12.0 - 16.0 g/dL    Hematocrit 41.8 36.0 - 46.0 %    MCV 90 80 - 100 fL    MCH 28.5 26.0 - 34.0 pg    MCHC 31.8 (L) 32.0 - 36.0 g/dL    RDW 12.3 11.5 - 14.5 %    Platelets 264 150 - 450 x10*3/uL   Basic Metabolic Panel    Collection Time: 06/18/25 10:38 AM   Result Value Ref Range    Glucose 85 74 - 99 mg/dL    Sodium 140 136 - 145 mmol/L    Potassium 4.3 3.5 - 5.3 mmol/L    Chloride 104 98 - 107 mmol/L    Bicarbonate 28 21 - 32 mmol/L    Anion Gap 12 10 - 20 mmol/L    Urea Nitrogen 8 6 - 23 mg/dL    Creatinine 0.60 0.50 - 1.05 mg/dL    eGFR >90 >60 mL/min/1.73m*2    Calcium 9.4 8.6 - 10.6 mg/dL   Type And Screen Is this order related to pregnancy or an upcoming surgery? Yes; Where will this surgery/delivery be performed? [facility]; What is the date of the surgery? 7/7/2025; Has this patient ever had a transfusion? No; Has th...    Collection Time: 06/18/25 10:38 AM   Result Value Ref Range     ABO TYPE O     Rh TYPE POS     ANTIBODY SCREEN NEG    Urinalysis with Reflex Culture and Microscopic    Collection Time: 06/18/25 10:38 AM   Result Value Ref Range    Color, Urine Dark-Yellow Light-Yellow, Yellow, Dark-Yellow    Appearance, Urine Clear Clear    Specific Gravity, Urine 1.006 1.005 - 1.035    pH, Urine 7.0 5.0, 5.5, 6.0, 6.5, 7.0, 7.5, 8.0    Protein, Urine NEGATIVE NEGATIVE, 10 (TRACE), 20 (TRACE) mg/dL    Glucose, Urine Normal Normal mg/dL    Blood, Urine NEGATIVE NEGATIVE mg/dL    Ketones, Urine NEGATIVE NEGATIVE mg/dL    Bilirubin, Urine 1 (1+) (A) NEGATIVE mg/dL    Urobilinogen, Urine 3 (1+) (A) Normal mg/dL    Nitrite, Urine 1+ (A) NEGATIVE    Leukocyte Esterase, Urine 75 Nicolle/uL (A) NEGATIVE   Extra Urine Gray Tube    Collection Time: 06/18/25 10:38 AM   Result Value Ref Range    Extra Tube     Microscopic Only, Urine    Collection Time: 06/18/25 10:38 AM   Result Value Ref Range    WBC, Urine 11-20 (A) 1-5, NONE /HPF    RBC, Urine 1-2 NONE, 1-2, 3-5 /HPF    Squamous Epithelial Cells, Urine 1-9 (SPARSE) Reference range not established. /HPF           Camryn Cevallos PA-C     Medication instructions and NPO guidelines reviewed with the patient.  All questions or concerns discussed and addressed.   The above clinical summary has been dictated with voice recognition software. It has not been proofread for grammatical errors, typographical mistakes, or other semantic inconsistencies.   All labs/imaging included on this documentation were reviewed/considered in medical decision making for perioperative planning, including labs ordered day of CPM appointment.           [1]   Past Medical History:  Diagnosis Date    Acute promyelocytic leukemia in remission (Multi)     Anxiety     Hip dysplasia (HHS-HCC)     Hyperlipidemia     Hyperopia     Nipple discharge     Peripheral neuropathy     very mild, improved after chemo cessation    Personal history of leukemia     History of leukemia    Personal  history of other specified conditions     History of genetic disorder    Retinal hemorrhage of both eyes    [2]   Past Surgical History:  Procedure Laterality Date    MEDIPORT      MEDIPORT REMOVAL      OTHER SURGICAL HISTORY      bone marrow    VAGINA SURGERY  2008   [3]   Family History  Problem Relation Name Age of Onset    No Known Problems Mother      No Known Problems Father      Stroke Mother's Sister     [4]   Allergies  Allergen Reactions    Penicillins Hives

## 2025-06-18 NOTE — NURSING NOTE
Patient seen in PAT. Orders reviewed with PAT Provider.     Following labs obtained by documenting RN:   URINE, MRSA SWAB, T/S, BMP,  CBC    EKG: COMPLETED TODAY    Patient discharged with: Pre-procedure instructions/AVS.    Denisa STEPHENSN, RN  [facility] Pre-Admission Testing   [facility]

## 2025-06-19 LAB
BACTERIA UR CULT: ABNORMAL
STAPHYLOCOCCUS SPEC CULT: NORMAL

## 2025-06-19 PROCEDURE — 93005 ELECTROCARDIOGRAM TRACING: CPT

## 2025-07-09 ASSESSMENT — ENCOUNTER SYMPTOMS
ABDOMINAL PAIN: 0
FLANK PAIN: 0
HEMATURIA: 0
HEADACHES: 0
CHILLS: 0
BACK PAIN: 0
CONSTIPATION: 0
VOMITING: 0
FEVER: 0
DIARRHEA: 0
FREQUENCY: 0
SORE THROAT: 0
ANOREXIA: 0
NAUSEA: 0
DYSURIA: 0

## 2025-07-10 ENCOUNTER — APPOINTMENT (OUTPATIENT)
Dept: OBSTETRICS AND GYNECOLOGY | Facility: CLINIC | Age: 25
End: 2025-07-10
Payer: MEDICARE

## 2025-07-10 VITALS
SYSTOLIC BLOOD PRESSURE: 104 MMHG | WEIGHT: 178 LBS | HEIGHT: 68 IN | DIASTOLIC BLOOD PRESSURE: 73 MMHG | BODY MASS INDEX: 26.98 KG/M2

## 2025-07-10 DIAGNOSIS — N64.52 NIPPLE DISCHARGE: ICD-10-CM

## 2025-07-10 DIAGNOSIS — N64.3 GALACTORRHEA: ICD-10-CM

## 2025-07-10 DIAGNOSIS — N89.8 VAGINAL DISCHARGE: ICD-10-CM

## 2025-07-10 DIAGNOSIS — N89.8 VAGINA ITCHING: Primary | ICD-10-CM

## 2025-07-10 PROCEDURE — 99204 OFFICE O/P NEW MOD 45 MIN: CPT | Performed by: OBSTETRICS & GYNECOLOGY

## 2025-07-10 PROCEDURE — 1036F TOBACCO NON-USER: CPT | Performed by: OBSTETRICS & GYNECOLOGY

## 2025-07-10 PROCEDURE — 3008F BODY MASS INDEX DOCD: CPT | Performed by: OBSTETRICS & GYNECOLOGY

## 2025-07-10 RX ORDER — ALBUTEROL SULFATE 90 UG/1
INHALANT RESPIRATORY (INHALATION) EVERY 4 HOURS
COMMUNITY
Start: 2025-07-03

## 2025-07-10 SDOH — ECONOMIC STABILITY: FOOD INSECURITY: WITHIN THE PAST 12 MONTHS, YOU WORRIED THAT YOUR FOOD WOULD RUN OUT BEFORE YOU GOT MONEY TO BUY MORE.: NEVER TRUE

## 2025-07-10 SDOH — ECONOMIC STABILITY: FOOD INSECURITY: WITHIN THE PAST 12 MONTHS, THE FOOD YOU BOUGHT JUST DIDN'T LAST AND YOU DIDN'T HAVE MONEY TO GET MORE.: NEVER TRUE

## 2025-07-10 SDOH — ECONOMIC STABILITY: INCOME INSECURITY: IN THE LAST 12 MONTHS, WAS THERE A TIME WHEN YOU WERE NOT ABLE TO PAY THE MORTGAGE OR RENT ON TIME?: NO

## 2025-07-10 SDOH — ECONOMIC STABILITY: TRANSPORTATION INSECURITY
IN THE PAST 12 MONTHS, HAS THE LACK OF TRANSPORTATION KEPT YOU FROM MEDICAL APPOINTMENTS OR FROM GETTING MEDICATIONS?: NO

## 2025-07-10 SDOH — ECONOMIC STABILITY: TRANSPORTATION INSECURITY
IN THE PAST 12 MONTHS, HAS LACK OF TRANSPORTATION KEPT YOU FROM MEETINGS, WORK, OR FROM GETTING THINGS NEEDED FOR DAILY LIVING?: NO

## 2025-07-10 ASSESSMENT — LIFESTYLE VARIABLES
AUDIT-C TOTAL SCORE: 2
HOW OFTEN DO YOU HAVE A DRINK CONTAINING ALCOHOL: 2-4 TIMES A MONTH
HOW OFTEN DO YOU HAVE SIX OR MORE DRINKS ON ONE OCCASION: NEVER
SKIP TO QUESTIONS 9-10: 1
HOW MANY STANDARD DRINKS CONTAINING ALCOHOL DO YOU HAVE ON A TYPICAL DAY: 1 OR 2

## 2025-07-10 ASSESSMENT — SOCIAL DETERMINANTS OF HEALTH (SDOH)
WITHIN THE LAST YEAR, HAVE YOU BEEN HUMILIATED OR EMOTIONALLY ABUSED IN OTHER WAYS BY YOUR PARTNER OR EX-PARTNER?: NO
WITHIN THE LAST YEAR, HAVE TO BEEN RAPED OR FORCED TO HAVE ANY KIND OF SEXUAL ACTIVITY BY YOUR PARTNER OR EX-PARTNER?: NO
WITHIN THE LAST YEAR, HAVE YOU BEEN AFRAID OF YOUR PARTNER OR EX-PARTNER?: NO
WITHIN THE LAST YEAR, HAVE YOU BEEN KICKED, HIT, SLAPPED, OR OTHERWISE PHYSICALLY HURT BY YOUR PARTNER OR EX-PARTNER?: NO
HOW HARD IS IT FOR YOU TO PAY FOR THE VERY BASICS LIKE FOOD, HOUSING, MEDICAL CARE, AND HEATING?: NOT HARD AT ALL

## 2025-07-10 NOTE — PROGRESS NOTES
"Answers submitted by the patient for this visit:  Female Genital Questionnaire (Submitted on 7/9/2025)  Chief Complaint: Female genitourinary complaint  genital itching: Yes  genital lesions: No  genital odor: No  genital rash: No  missed menses: No  pelvic pain: No  vaginal bleeding: No  Chronicity: recurrent  Onset: more than 1 month ago  Frequency: intermittently  Pain severity: mild  Affected side: both  Pregnant now?: No  abdominal pain: No  anorexia: No  back pain: No  chills: No  constipation: No  diarrhea: No  discolored urine: No  dysuria: No  fever: No  flank pain: No  frequency: No  headaches: No  hematuria: No  joint pain: No  joint swelling: No  nausea: No  painful intercourse: No  rash: No  sore throat: No  urgency: No  vomiting: No  Sexual activity: sexually active  Partner with STD symptoms: no  Birth control: nothing  Menstrual history: regular  Discharge characteristics: clear, copious, watery  Passing clots?: No  Passing tissue?: No    Subjective   Cayetano Crooks is a 24 y.o. female who presents for evaluation of an abnormal vaginal concerns. Symptoms have been present for 6 months.  States 3 days prior to her cycle she gets red inflamed has discharge and itching.  Denies urinary symptoms.   Contraception: none.  Sexually transmitted infection risk: very low risk of STD exposure.  Is sexually active without birth control.  Prenatal vitamins encouraged    Patient also with concern of nipple discharge.  Happens whenever she is excited or sad.  Will draw prolactin level today.  If elevated will get to endocrinology.  If normal will have her see breast center.    Objective   /73   Ht 1.727 m (5' 8\")   Wt 80.7 kg (178 lb)   LMP 06/29/2025   BMI 27.06 kg/m²    Physical Exam   General: No distress.  Alert and oriented  Abdomen: Soft, nontender, nondistended  External Genitalia:  no masses.  no erythema.  no discharge noted.  Vagina:  no masses.  Clear discharge noted.    Cervix: no masses.  "   Uterus:  normal size and contour.  no masses. palpated  Adnexa: R: no masses, non tender.    Adnexa: L: no masses.  non tender  Extremities: No swelling  Psych: No sadness.      Assessment/Plan   1) Vaginal Concerns.  Testing sent to lab today.  Will await results    2) Galactorrhea  Prolactin today  If elevated will go to endocrinology.  If normal or low will follow-up with the breast center    Thank you for your visit to our office today  Please follow up at needed or with your annual exam

## 2025-07-11 ENCOUNTER — PATIENT MESSAGE (OUTPATIENT)
Dept: OBSTETRICS AND GYNECOLOGY | Facility: CLINIC | Age: 25
End: 2025-07-11
Payer: COMMERCIAL

## 2025-07-11 DIAGNOSIS — N64.3 GALACTORRHEA: ICD-10-CM

## 2025-07-11 DIAGNOSIS — N64.52 NIPPLE DISCHARGE: Primary | ICD-10-CM

## 2025-07-11 LAB
BV SCORE VAG QL: NORMAL
PROLACTIN SERPL-MCNC: 13.8 NG/ML

## 2025-07-22 ENCOUNTER — APPOINTMENT (OUTPATIENT)
Age: 25
End: 2025-07-22
Payer: COMMERCIAL

## 2025-07-29 NOTE — PROGRESS NOTES
Cayetano Crooks female   2000 24 y.o.   [id number]      Chief Complaint  ***    History Of Present Illness  Cayetano Crooks is a 24 y.o. female  referred by [facility]rd to the [facility] for clear nipple discharge. She denies breast biopsy or surgery. She denies family history breast cancer.     BREAST IMAGIN2025 bilateral breast ultrasound BI-RADS Category      7/10/25 Prolactin level normal, 13.8ng/mL    REPRODUCTIVE HISTORY: menarche age, GP, first birth age, , OCP's, premenopausal, LMP,  no HRT, breast tissue                                   FAMILY CANCER HISTORY:      Surgical History  She has a past surgical history that includes Vagina surgery (); mediport; Mediport removal; and Bone marrow biopsy.     Social History  She reports that she has never smoked. She has been exposed to tobacco smoke. She has never used smokeless tobacco. She reports that she does not currently use alcohol. She reports current drug use. Frequency: 7.00 times per week. Drug: Marijuana.    Family History  Family History[1]     Allergies  Penicillins    Medications  Current Outpatient Medications   Medication Instructions    albuterol 90 mcg/actuation inhaler Every 4 hours    cetirizine (ZYRTEC) 10 mg, Daily    chlorhexidine (Hibiclens) 4 % external liquid Topical, Daily PRN, Use for 5 days prior to surgery as body wash, do not use on face or genital region. 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     Physical Exam  Patient is alert and oriented x3 and in a relaxed and appropriate mood. Her gait is steady and hand grasps are equal. Sclera is clear. The breasts are nearly symmetrical. The tissue is soft without palpable abnormalities, discrete nodules or masses. The skin and nipples appear normal. There is no cervical, supraclavicular or axillary lymphadenopathy. Heart rate and rhythm normal, S1 and S2 appreciated. The lungs are clear to auscultation bilaterally. Abdomen is soft and non-tender.       Physical Exam     Last Recorded Vitals  There were no vitals filed for this visit.    Relevant Results   Time was spent viewing digital images of the radiology testing with the patient. I explained the results in depth, along with suggested explanation for follow up recommendations based on the testing results. BI-RADS Category ***    Imaging  No results found for this or any previous visit from the past 365 days.       Assessment/Plan       PLAN:  ***    Patient Discussion/Summary  Your clinical examination and imaging are normal. Please return in one year for bilateral screening mammogram and office visit or sooner if you have any problems or concerns.     You can see your health information, review clinical summaries from office visits & test results online when you follow your health with MY  Chart, a personal health record. To sign up go to [email or web address]. If you need assistance with signing up or trouble getting into your account call [employer] Patient Line 24/7 at [phone].    My office phone number is [phone] if you need to get in touch with me or have additional questions or concerns. Thank you for choosing [facility] and trusting me as your healthcare provider. I look forward to seeing you again at your next office visit. I am honored to be  a provider on your health care team and I remain dedicated to helping you achieve your health goals.      Anna Arreola, APRN-CNP         [1]   Family History  Problem Relation Name Age of Onset    No Known Problems Mother      No Known Problems Father      Stroke Mother's Sister      Stroke Mother's Sister Bibiana Murphy     Stroke Other Bibiana Murphy

## 2025-07-31 ENCOUNTER — APPOINTMENT (OUTPATIENT)
Dept: RADIOLOGY | Facility: HOSPITAL | Age: 25
End: 2025-07-31
Payer: MEDICARE

## 2025-07-31 ENCOUNTER — APPOINTMENT (OUTPATIENT)
Dept: SURGICAL ONCOLOGY | Facility: HOSPITAL | Age: 25
End: 2025-07-31
Payer: MEDICARE

## 2025-08-15 ENCOUNTER — APPOINTMENT (OUTPATIENT)
Dept: OPHTHALMOLOGY | Facility: CLINIC | Age: 25
End: 2025-08-15
Payer: COMMERCIAL

## 2025-08-15 ENCOUNTER — HOSPITAL ENCOUNTER (OUTPATIENT)
Dept: RADIOLOGY | Facility: HOSPITAL | Age: 25
Discharge: HOME | End: 2025-08-15
Payer: COMMERCIAL

## 2025-08-15 ENCOUNTER — RESULTS FOLLOW-UP (OUTPATIENT)
Dept: SURGICAL ONCOLOGY | Facility: HOSPITAL | Age: 25
End: 2025-08-15

## 2025-08-15 DIAGNOSIS — N64.52 BILATERAL NIPPLE DISCHARGE: ICD-10-CM

## 2025-08-15 PROCEDURE — 76983 USE EA ADDL TARGET LESION: CPT

## 2025-08-15 PROCEDURE — 76642 ULTRASOUND BREAST LIMITED: CPT

## 2025-08-18 ENCOUNTER — APPOINTMENT (OUTPATIENT)
Dept: OPHTHALMOLOGY | Facility: CLINIC | Age: 25
End: 2025-08-18
Payer: MEDICARE

## 2025-11-03 ENCOUNTER — APPOINTMENT (OUTPATIENT)
Dept: OPHTHALMOLOGY | Facility: CLINIC | Age: 25
End: 2025-11-03
Payer: MEDICARE

## 2026-07-13 ENCOUNTER — APPOINTMENT (OUTPATIENT)
Dept: OBSTETRICS AND GYNECOLOGY | Facility: CLINIC | Age: 26
End: 2026-07-13
Payer: COMMERCIAL